# Patient Record
Sex: MALE | Race: BLACK OR AFRICAN AMERICAN | NOT HISPANIC OR LATINO | URBAN - METROPOLITAN AREA
[De-identification: names, ages, dates, MRNs, and addresses within clinical notes are randomized per-mention and may not be internally consistent; named-entity substitution may affect disease eponyms.]

---

## 2017-05-08 ENCOUNTER — EMERGENCY (EMERGENCY)
Facility: HOSPITAL | Age: 27
LOS: 1 days | Discharge: HOME | End: 2017-05-08
Attending: EMERGENCY MEDICINE | Admitting: EMERGENCY MEDICINE
Payer: COMMERCIAL

## 2017-05-08 VITALS
TEMPERATURE: 98 F | SYSTOLIC BLOOD PRESSURE: 127 MMHG | RESPIRATION RATE: 22 BRPM | WEIGHT: 203.27 LBS | OXYGEN SATURATION: 98 % | HEART RATE: 81 BPM | DIASTOLIC BLOOD PRESSURE: 78 MMHG

## 2017-05-08 VITALS
RESPIRATION RATE: 20 BRPM | SYSTOLIC BLOOD PRESSURE: 118 MMHG | OXYGEN SATURATION: 97 % | DIASTOLIC BLOOD PRESSURE: 71 MMHG | TEMPERATURE: 98 F | HEART RATE: 79 BPM

## 2017-05-08 DIAGNOSIS — J45.901 UNSPECIFIED ASTHMA WITH (ACUTE) EXACERBATION: ICD-10-CM

## 2017-05-08 DIAGNOSIS — Z79.899 OTHER LONG TERM (CURRENT) DRUG THERAPY: ICD-10-CM

## 2017-05-08 DIAGNOSIS — F17.290 NICOTINE DEPENDENCE, OTHER TOBACCO PRODUCT, UNCOMPLICATED: ICD-10-CM

## 2017-05-08 PROCEDURE — 99284 EMERGENCY DEPT VISIT MOD MDM: CPT | Mod: 25

## 2017-05-08 PROCEDURE — 99284 EMERGENCY DEPT VISIT MOD MDM: CPT

## 2017-05-08 PROCEDURE — 94640 AIRWAY INHALATION TREATMENT: CPT

## 2017-05-08 RX ORDER — IPRATROPIUM/ALBUTEROL SULFATE 18-103MCG
3 AEROSOL WITH ADAPTER (GRAM) INHALATION
Qty: 0 | Refills: 0 | Status: COMPLETED | OUTPATIENT
Start: 2017-05-08 | End: 2017-05-08

## 2017-05-08 RX ORDER — ALBUTEROL 90 UG/1
2 AEROSOL, METERED ORAL
Qty: 1 | Refills: 0
Start: 2017-05-08 | End: 2017-05-15

## 2017-05-08 RX ADMIN — Medication 3 MILLILITER(S): at 20:20

## 2017-05-08 RX ADMIN — Medication 3 MILLILITER(S): at 19:55

## 2017-05-08 RX ADMIN — Medication 60 MILLIGRAM(S): at 19:57

## 2017-05-08 RX ADMIN — Medication 3 MILLILITER(S): at 19:57

## 2017-05-08 NOTE — ED PROVIDER NOTE - OBJECTIVE STATEMENT
The pt is a 25 y/o M, who presents to ED c/o asthma exacerbation x few d. Hx of asthma - last attack 1 yr ago, never hosp, never intubated. Pt c/o wheezing and chest tightness, triggered by weather changes. Denies fevers, chills, cp, cough, n/v, leg pain or swelling

## 2017-05-08 NOTE — ED PROVIDER NOTE - MEDICAL DECISION MAKING DETAILS
asthma exacerbation w/o acute resp distress, no hypoxia or tachypnea, initial , ambulatory w/o sob, well appearing, improved w/nebs, will d/c w/short course of steroids, to f/u w/clinic

## 2017-05-08 NOTE — ED ADULT TRIAGE NOTE - CHIEF COMPLAINT QUOTE
c/o asthma exacerbation that started on Saturday accompanied with productive cough. Denies fever or chills.

## 2017-05-08 NOTE — ED PROVIDER NOTE - ATTENDING CONTRIBUTION TO CARE
pt seen and examined by me, agree with above. 25 yo male with hx of asthma here with asthma exacerbation,  usually well controlled uses rescue inhaler.  on exam mild exp wheeze throughout, no distress, heart normal.  feels better after neb treatments.  will dc on steroids and albuterol, fu pmd

## 2017-05-24 ENCOUNTER — EMERGENCY (EMERGENCY)
Facility: HOSPITAL | Age: 27
LOS: 1 days | Discharge: HOME | End: 2017-05-24
Attending: EMERGENCY MEDICINE | Admitting: EMERGENCY MEDICINE
Payer: COMMERCIAL

## 2017-05-24 VITALS
DIASTOLIC BLOOD PRESSURE: 66 MMHG | OXYGEN SATURATION: 100 % | HEART RATE: 68 BPM | RESPIRATION RATE: 18 BRPM | SYSTOLIC BLOOD PRESSURE: 118 MMHG

## 2017-05-24 VITALS
SYSTOLIC BLOOD PRESSURE: 125 MMHG | TEMPERATURE: 98 F | HEART RATE: 75 BPM | WEIGHT: 205.25 LBS | DIASTOLIC BLOOD PRESSURE: 66 MMHG | OXYGEN SATURATION: 95 % | RESPIRATION RATE: 17 BRPM

## 2017-05-24 DIAGNOSIS — J45.901 UNSPECIFIED ASTHMA WITH (ACUTE) EXACERBATION: ICD-10-CM

## 2017-05-24 DIAGNOSIS — Z79.899 OTHER LONG TERM (CURRENT) DRUG THERAPY: ICD-10-CM

## 2017-05-24 DIAGNOSIS — Z79.52 LONG TERM (CURRENT) USE OF SYSTEMIC STEROIDS: ICD-10-CM

## 2017-05-24 DIAGNOSIS — F17.200 NICOTINE DEPENDENCE, UNSPECIFIED, UNCOMPLICATED: ICD-10-CM

## 2017-05-24 PROCEDURE — 99284 EMERGENCY DEPT VISIT MOD MDM: CPT

## 2017-05-24 PROCEDURE — 94640 AIRWAY INHALATION TREATMENT: CPT

## 2017-05-24 PROCEDURE — 99284 EMERGENCY DEPT VISIT MOD MDM: CPT | Mod: 25

## 2017-05-24 RX ORDER — IPRATROPIUM/ALBUTEROL SULFATE 18-103MCG
3 AEROSOL WITH ADAPTER (GRAM) INHALATION
Qty: 0 | Refills: 0 | Status: COMPLETED | OUTPATIENT
Start: 2017-05-24 | End: 2017-05-24

## 2017-05-24 RX ADMIN — Medication 60 MILLIGRAM(S): at 15:05

## 2017-05-24 RX ADMIN — Medication 3 MILLILITER(S): at 15:24

## 2017-05-24 RX ADMIN — Medication 3 MILLILITER(S): at 16:00

## 2017-05-24 RX ADMIN — Medication 3 MILLILITER(S): at 15:05

## 2017-05-24 NOTE — ED PROVIDER NOTE - MEDICAL DECISION MAKING DETAILS
pt with asthma presents with exacerbation and wheezing - received nebs and steroids with good relief - will send home on prednisone

## 2017-05-24 NOTE — ED PROVIDER NOTE - OBJECTIVE STATEMENT
25 y/o m presents with wheezing and acute asthma exacerbation.  Denies fever, cough, chills, cough.  Has been using albuterol inhaler with some relief.

## 2017-05-24 NOTE — ED ADULT TRIAGE NOTE - ARRIVAL INFO ADDITIONAL COMMENTS
Pt presented to ED with cough. Wheezing noted bilaterally. Pt is able to speak coherently in full sentences. No intubation hx.

## 2017-06-01 ENCOUNTER — EMERGENCY (EMERGENCY)
Facility: HOSPITAL | Age: 27
LOS: 1 days | Discharge: HOME | End: 2017-06-01
Attending: EMERGENCY MEDICINE | Admitting: EMERGENCY MEDICINE
Payer: COMMERCIAL

## 2017-06-01 VITALS
HEART RATE: 73 BPM | OXYGEN SATURATION: 99 % | TEMPERATURE: 98 F | RESPIRATION RATE: 18 BRPM | SYSTOLIC BLOOD PRESSURE: 114 MMHG | DIASTOLIC BLOOD PRESSURE: 72 MMHG

## 2017-06-01 VITALS
HEART RATE: 103 BPM | WEIGHT: 208.34 LBS | SYSTOLIC BLOOD PRESSURE: 134 MMHG | RESPIRATION RATE: 18 BRPM | TEMPERATURE: 98 F | OXYGEN SATURATION: 95 % | DIASTOLIC BLOOD PRESSURE: 86 MMHG

## 2017-06-01 DIAGNOSIS — Z79.52 LONG TERM (CURRENT) USE OF SYSTEMIC STEROIDS: ICD-10-CM

## 2017-06-01 DIAGNOSIS — Z79.899 OTHER LONG TERM (CURRENT) DRUG THERAPY: ICD-10-CM

## 2017-06-01 DIAGNOSIS — J45.901 UNSPECIFIED ASTHMA WITH (ACUTE) EXACERBATION: ICD-10-CM

## 2017-06-01 LAB
ALBUMIN SERPL ELPH-MCNC: 4.9 G/DL — SIGNIFICANT CHANGE UP (ref 3.3–5)
ALP SERPL-CCNC: 65 U/L — SIGNIFICANT CHANGE UP (ref 40–120)
ALT FLD-CCNC: 48 U/L — HIGH (ref 10–45)
ANION GAP SERPL CALC-SCNC: 16 MMOL/L — SIGNIFICANT CHANGE UP (ref 5–17)
AST SERPL-CCNC: 22 U/L — SIGNIFICANT CHANGE UP (ref 10–40)
BASOPHILS NFR BLD AUTO: 0.3 % — SIGNIFICANT CHANGE UP (ref 0–2)
BILIRUB SERPL-MCNC: 0.4 MG/DL — SIGNIFICANT CHANGE UP (ref 0.2–1.2)
BUN SERPL-MCNC: 16 MG/DL — SIGNIFICANT CHANGE UP (ref 7–23)
CALCIUM SERPL-MCNC: 10.8 MG/DL — HIGH (ref 8.4–10.5)
CHLORIDE SERPL-SCNC: 100 MMOL/L — SIGNIFICANT CHANGE UP (ref 96–108)
CO2 SERPL-SCNC: 22 MMOL/L — SIGNIFICANT CHANGE UP (ref 22–31)
CREAT SERPL-MCNC: 0.9 MG/DL — SIGNIFICANT CHANGE UP (ref 0.5–1.3)
EOSINOPHIL NFR BLD AUTO: 0.3 % — SIGNIFICANT CHANGE UP (ref 0–6)
GLUCOSE SERPL-MCNC: 104 MG/DL — HIGH (ref 70–99)
HCT VFR BLD CALC: 44.1 % — SIGNIFICANT CHANGE UP (ref 39–50)
HGB BLD-MCNC: 15.5 G/DL — SIGNIFICANT CHANGE UP (ref 13–17)
LYMPHOCYTES # BLD AUTO: 10.2 % — LOW (ref 13–44)
MCHC RBC-ENTMCNC: 30.7 PG — SIGNIFICANT CHANGE UP (ref 27–34)
MCHC RBC-ENTMCNC: 35.1 G/DL — SIGNIFICANT CHANGE UP (ref 32–36)
MCV RBC AUTO: 87.3 FL — SIGNIFICANT CHANGE UP (ref 80–100)
MONOCYTES NFR BLD AUTO: 3.1 % — SIGNIFICANT CHANGE UP (ref 2–14)
NEUTROPHILS NFR BLD AUTO: 86.1 % — HIGH (ref 43–77)
PLATELET # BLD AUTO: 295 K/UL — SIGNIFICANT CHANGE UP (ref 150–400)
POTASSIUM SERPL-MCNC: 4.2 MMOL/L — SIGNIFICANT CHANGE UP (ref 3.5–5.3)
POTASSIUM SERPL-SCNC: 4.2 MMOL/L — SIGNIFICANT CHANGE UP (ref 3.5–5.3)
PROT SERPL-MCNC: 8.5 G/DL — HIGH (ref 6–8.3)
RBC # BLD: 5.05 M/UL — SIGNIFICANT CHANGE UP (ref 4.2–5.8)
RBC # FLD: 11 % — SIGNIFICANT CHANGE UP (ref 10.3–16.9)
SODIUM SERPL-SCNC: 138 MMOL/L — SIGNIFICANT CHANGE UP (ref 135–145)
WBC # BLD: 9.8 K/UL — SIGNIFICANT CHANGE UP (ref 3.8–10.5)
WBC # FLD AUTO: 9.8 K/UL — SIGNIFICANT CHANGE UP (ref 3.8–10.5)

## 2017-06-01 PROCEDURE — 99284 EMERGENCY DEPT VISIT MOD MDM: CPT

## 2017-06-01 PROCEDURE — 80053 COMPREHEN METABOLIC PANEL: CPT

## 2017-06-01 PROCEDURE — 99284 EMERGENCY DEPT VISIT MOD MDM: CPT | Mod: 25

## 2017-06-01 PROCEDURE — 96374 THER/PROPH/DIAG INJ IV PUSH: CPT

## 2017-06-01 PROCEDURE — 71020: CPT | Mod: 26

## 2017-06-01 PROCEDURE — 85025 COMPLETE CBC W/AUTO DIFF WBC: CPT

## 2017-06-01 PROCEDURE — 36415 COLL VENOUS BLD VENIPUNCTURE: CPT

## 2017-06-01 PROCEDURE — 94640 AIRWAY INHALATION TREATMENT: CPT

## 2017-06-01 PROCEDURE — 71046 X-RAY EXAM CHEST 2 VIEWS: CPT

## 2017-06-01 PROCEDURE — 96375 TX/PRO/DX INJ NEW DRUG ADDON: CPT

## 2017-06-01 RX ORDER — MAGNESIUM SULFATE 500 MG/ML
2 VIAL (ML) INJECTION ONCE
Qty: 0 | Refills: 0 | Status: COMPLETED | OUTPATIENT
Start: 2017-06-01 | End: 2017-06-01

## 2017-06-01 RX ORDER — ALBUTEROL 90 UG/1
2 AEROSOL, METERED ORAL
Qty: 1 | Refills: 0
Start: 2017-06-01 | End: 2017-06-08

## 2017-06-01 RX ORDER — SODIUM CHLORIDE 9 MG/ML
1000 INJECTION INTRAMUSCULAR; INTRAVENOUS; SUBCUTANEOUS ONCE
Qty: 0 | Refills: 0 | Status: COMPLETED | OUTPATIENT
Start: 2017-06-01 | End: 2017-06-01

## 2017-06-01 RX ORDER — IPRATROPIUM/ALBUTEROL SULFATE 18-103MCG
3 AEROSOL WITH ADAPTER (GRAM) INHALATION ONCE
Qty: 0 | Refills: 0 | Status: COMPLETED | OUTPATIENT
Start: 2017-06-01 | End: 2017-06-01

## 2017-06-01 RX ADMIN — Medication 125 MILLIGRAM(S): at 17:53

## 2017-06-01 RX ADMIN — Medication 3 MILLILITER(S): at 14:25

## 2017-06-01 RX ADMIN — Medication 3 MILLILITER(S): at 17:58

## 2017-06-01 RX ADMIN — SODIUM CHLORIDE 1000 MILLILITER(S): 9 INJECTION INTRAMUSCULAR; INTRAVENOUS; SUBCUTANEOUS at 17:55

## 2017-06-01 RX ADMIN — Medication 60 MILLIGRAM(S): at 14:32

## 2017-06-01 RX ADMIN — Medication 50 GRAM(S): at 17:55

## 2017-06-01 RX ADMIN — Medication 3 MILLILITER(S): at 14:32

## 2017-06-01 NOTE — ED PROVIDER NOTE - PROGRESS NOTE DETAILS
clinically improved peak flow= clinically improved peak flow= 275- 300 -- his normal is 300 will dc home return precautions d/w pt including  fevers chills  sob increased cough

## 2017-06-01 NOTE — ED PROVIDER NOTE - MEDICAL DECISION MAKING DETAILS
recurrent asthma exaccerbation- neg CXR - likely viral uri - peak flow improved   no further wheezing---no sputum production or fevers or chills well appearing ? seasonal changes

## 2017-06-01 NOTE — ED PROVIDER NOTE - OBJECTIVE STATEMENT
25 yo male with hx of asthma last exacerbation 3 weeks ago  now with recurrent wheezing and dry cough x 1 day  no CP or sob  no N/V   no fevers or chills was complaint with 4 days of prrednisone 3 weeks ago - appetite ok 25 yo male with hx of asthma last exacerbation 3 weeks ago  now with recurrent wheezing and dry cough x 1 day  no CP or sob  no N/V   no fevers or chills was complaint with 4 days of prednisone 3 weeks ago - appetite ok

## 2018-05-18 ENCOUNTER — EMERGENCY (EMERGENCY)
Facility: HOSPITAL | Age: 28
LOS: 1 days | Discharge: HOME | End: 2018-05-18
Attending: EMERGENCY MEDICINE | Admitting: EMERGENCY MEDICINE
Payer: COMMERCIAL

## 2018-05-18 VITALS
TEMPERATURE: 98 F | SYSTOLIC BLOOD PRESSURE: 130 MMHG | DIASTOLIC BLOOD PRESSURE: 76 MMHG | RESPIRATION RATE: 20 BRPM | HEART RATE: 101 BPM | OXYGEN SATURATION: 94 %

## 2018-05-18 VITALS
OXYGEN SATURATION: 96 % | SYSTOLIC BLOOD PRESSURE: 125 MMHG | RESPIRATION RATE: 20 BRPM | DIASTOLIC BLOOD PRESSURE: 77 MMHG | HEART RATE: 89 BPM | TEMPERATURE: 98 F

## 2018-05-18 DIAGNOSIS — J45.21 MILD INTERMITTENT ASTHMA WITH (ACUTE) EXACERBATION: ICD-10-CM

## 2018-05-18 DIAGNOSIS — Z79.899 OTHER LONG TERM (CURRENT) DRUG THERAPY: ICD-10-CM

## 2018-05-18 DIAGNOSIS — Z87.891 PERSONAL HISTORY OF NICOTINE DEPENDENCE: ICD-10-CM

## 2018-05-18 PROCEDURE — 99284 EMERGENCY DEPT VISIT MOD MDM: CPT

## 2018-05-18 PROCEDURE — 99284 EMERGENCY DEPT VISIT MOD MDM: CPT | Mod: 25

## 2018-05-18 PROCEDURE — 94640 AIRWAY INHALATION TREATMENT: CPT

## 2018-05-18 RX ORDER — IPRATROPIUM/ALBUTEROL SULFATE 18-103MCG
3 AEROSOL WITH ADAPTER (GRAM) INHALATION ONCE
Qty: 0 | Refills: 0 | Status: COMPLETED | OUTPATIENT
Start: 2018-05-18 | End: 2018-05-18

## 2018-05-18 RX ORDER — ALBUTEROL 90 UG/1
2 AEROSOL, METERED ORAL
Qty: 1 | Refills: 0
Start: 2018-05-18 | End: 2018-06-16

## 2018-05-18 RX ADMIN — Medication 3 MILLILITER(S): at 18:40

## 2018-05-18 RX ADMIN — Medication 40 MILLIGRAM(S): at 18:54

## 2018-05-18 RX ADMIN — Medication 3 MILLILITER(S): at 18:54

## 2018-05-18 NOTE — ED PROVIDER NOTE - ATTENDING CONTRIBUTION TO CARE
Pt w hx of asthma w wheezing, no acute sob, tachypnea, received steroids, neb treatments with improvement, ambulating, sats above 95, no s/s of PE, pna, will dc with continuation of q4 nebs, steroids, return for worsening sx.

## 2018-05-18 NOTE — ED PROVIDER NOTE - PHYSICAL EXAMINATION
CONSTITUTIONAL: WD,WN. NAD.    SKIN: Normal color and turgor. No rash.    HEAD: NC/AT.  EYES: Conjunctiva clear. EOMI. PERRL.    ENT: Airway patent, OP without erythema, tonsillar swelling or exudate; uvula midline without swelling. Nasal mucosa clear, no rhinorrhea.   RESPIRATORY:  .  Coughing frequently.  Breathing non-labored. No retractions or accessory muscle use.  Lungs mild wheeze bilat.    CARDIOVASCULAR:  RRR, S1S2. No M/R/G.      GI:  Abdomen soft, nontender.    MSK: Neck supple with painless ROM.  No extremity edema or tenderness.  No joint swelling or ROM limitation.  NEURO: Alert and oriented; CN II-XII grossly intact. Speech clear. 5/5 strength in all extremities.  Normal balance and gait.

## 2018-05-18 NOTE — ED PROVIDER NOTE - MEDICAL DECISION MAKING DETAILS
Asthma exacerbation improved markedly with 2 nebs, PO steroid started.  PERC neg, do not suspect PE.  No sputum/fever to suggest pneumonia.  Will continue prednisone for 3 more days.

## 2018-05-18 NOTE — ED PROVIDER NOTE - OBJECTIVE STATEMENT
pt with Hx mild intermittent asthma c/o cough and chest tightness c/w his usual asthma.  began about a week ago, seen at [facility] ED 4 days ago tx with 2 nebs and a single dose steroids.  symptoms waxing/waning, has used apx 50 doses of albuterol in past 2 days.  trigger is environmental/seasonal.  no chest pain, fever, sputum/hemoptysis.  no hx of DVT/PE.  no recent immobilization, leg swelling.  no hx of heart disease, HTN, HLD.  never intubated or hospitalized.  PMHx asthma  PSHx none  Rx albuterol PRN  NKA  Soc: quit smoking 2 weeks ago; no cocaine/other drugs  Fam: no hx of early heart disease/sudden death

## 2018-05-18 NOTE — ED PROVIDER NOTE - NS ED ROS FT
CONSTITUTIONAL: No fever, chills, or weakness  NEURO: No headache, no dizziness, no syncope; No weakness/tingling/numbness  EYES: itchy watery eys before asthma attack  ENT: rhinorrhea and nasal congestion prior to asthma attack  PULM: HPI  CV: No chest pain or palpitations  GI: No abdominal pain, vomiting, or diarrhea  : No dysuria, hematuria, frequency  MSK: No neck pain or back pain, no joint pain  SKIN: no rash

## 2019-04-15 ENCOUNTER — EMERGENCY (EMERGENCY)
Facility: HOSPITAL | Age: 29
LOS: 1 days | Discharge: HOME | End: 2019-04-15
Admitting: EMERGENCY MEDICINE
Payer: COMMERCIAL

## 2019-04-15 VITALS
HEART RATE: 75 BPM | SYSTOLIC BLOOD PRESSURE: 128 MMHG | WEIGHT: 205.25 LBS | TEMPERATURE: 98 F | RESPIRATION RATE: 18 BRPM | OXYGEN SATURATION: 99 % | DIASTOLIC BLOOD PRESSURE: 77 MMHG | HEIGHT: 71 IN

## 2019-04-15 PROCEDURE — 73030 X-RAY EXAM OF SHOULDER: CPT | Mod: 26,RT

## 2019-04-15 PROCEDURE — 70486 CT MAXILLOFACIAL W/O DYE: CPT | Mod: 26

## 2019-04-15 PROCEDURE — 73030 X-RAY EXAM OF SHOULDER: CPT

## 2019-04-15 PROCEDURE — 90715 TDAP VACCINE 7 YRS/> IM: CPT

## 2019-04-15 PROCEDURE — 73562 X-RAY EXAM OF KNEE 3: CPT | Mod: 26,RT

## 2019-04-15 PROCEDURE — 70450 CT HEAD/BRAIN W/O DYE: CPT

## 2019-04-15 PROCEDURE — 99284 EMERGENCY DEPT VISIT MOD MDM: CPT | Mod: 25

## 2019-04-15 PROCEDURE — 99284 EMERGENCY DEPT VISIT MOD MDM: CPT

## 2019-04-15 PROCEDURE — 90471 IMMUNIZATION ADMIN: CPT

## 2019-04-15 PROCEDURE — 70486 CT MAXILLOFACIAL W/O DYE: CPT

## 2019-04-15 PROCEDURE — 70450 CT HEAD/BRAIN W/O DYE: CPT | Mod: 26

## 2019-04-15 PROCEDURE — 73562 X-RAY EXAM OF KNEE 3: CPT

## 2019-04-15 RX ORDER — TETANUS TOXOID, REDUCED DIPHTHERIA TOXOID AND ACELLULAR PERTUSSIS VACCINE, ADSORBED 5; 2.5; 8; 8; 2.5 [IU]/.5ML; [IU]/.5ML; UG/.5ML; UG/.5ML; UG/.5ML
0.5 SUSPENSION INTRAMUSCULAR ONCE
Qty: 0 | Refills: 0 | Status: COMPLETED | OUTPATIENT
Start: 2019-04-15 | End: 2019-04-15

## 2019-04-15 RX ADMIN — TETANUS TOXOID, REDUCED DIPHTHERIA TOXOID AND ACELLULAR PERTUSSIS VACCINE, ADSORBED 0.5 MILLILITER(S): 5; 2.5; 8; 8; 2.5 SUSPENSION INTRAMUSCULAR at 19:19
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
## 2019-04-15 NOTE — ED ADULT NURSE NOTE - NSIMPLEMENTINTERV_GEN_ALL_ED
Implemented All Fall Risk Interventions:  [place] to call system. Call bell, personal items and telephone within reach. Instruct patient to call for assistance. Room bathroom lighting operational. Non-slip footwear when patient is off stretcher. Physically safe environment: no spills, clutter or unnecessary equipment. Stretcher in lowest position, wheels locked, appropriate side rails in place. Provide visual cue, wrist band, yellow gown, etc. Monitor gait and stability. Monitor for mental status changes and reorient to person, place, and time. Review medications for side effects contributing to fall risk. Reinforce activity limits and safety measures with patient and family.

## 2019-04-15 NOTE — ED PROVIDER NOTE - CLINICAL SUMMARY MEDICAL DECISION MAKING FREE TEXT BOX
29 y/o m s/p fall down steps 2 days ago with right facial injuries, right shoulder and knee pain; no LOC or neuro deficits, tetanus updated, CT head and maxillofacial negative, xrays unremarkable.  Will d/c, continue wound care, facial lac will heal by 2/2 intention.

## 2019-04-15 NOTE — ED PROVIDER NOTE - NSFOLLOWUPINSTRUCTIONS_ED_ALL_ED_FT
Laceration    AMBULATORY CARE:    A laceration is an injury to the skin and the soft tissue underneath it. Lacerations happen when you are cut or hit by something. They can happen anywhere on the body.    Common symptoms include the following:     Injury or wound to skin and tissue of any shape size that looks like a cut, tear, or gash      Edges of the wound may be close together or wide apart      Pain, bleeding, bruising, or swelling      Numbness around the wound      Decreased movement in an area below the wound    Seek care immediately if:     You have heavy bleeding or bleeding that does not stop after 10 minutes of holding firm, direct pressure over the wound.       Your wound opens up.     Contact your healthcare provider if:     You have a fever or chills.       Your laceration is red, warm, or swollen.      You have red streaks on your skin coming from your wound.      You have white or yellow drainage from the wound that smells bad.      You have pain that gets worse, even after treatment.       You have questions or concerns about your condition or care.     Treatment for a laceration includes care to stop any bleeding. Your healthcare provider will stop the bleeding by applying pressure to the wound. He may need to check your wound for foreign objects and clean it to decrease the chance of infection. Your laceration may be closed with stitches, staples, tissue glue, or medical strips. Ask your healthcare provider if you need a tetanus shot.     Medicines:     Prescription pain medicine may be given. Ask how to take this medicine safely.       Antibiotics help treat or prevent a bacterial infection.       Take your medicine as directed. Contact your healthcare provider if you think your medicine is not helping or if you have side effects. Tell him or her if you are allergic to any medicine. Keep a list of the medicines, vitamins, and herbs you take. Include the amounts, and when and why you take them. Bring the list or the pill bottles to follow-up visits. Carry your medicine list with you in case of an emergency.    Care for your wound as directed:     Do not get your wound wet until your healthcare provider says it is okay. Do not soak your wound in water. Do not go swimming until your healthcare provider says it is okay. Carefully wash the wound with soap and water. Gently pat the area dry or allow it to air dry.       Change your bandages when they get wet, dirty, or after washing. Apply new, clean bandages as directed. Do not apply elastic bandages or tape too tight. Do not put powders or lotions over your incision.       Apply antibiotic ointment as directed. Your healthcare provider may give you antibiotic ointment to put over your wound if you have stitches. If you have strips of tape over your incision, let them dry up and fall off on their own. If they do not fall off within 14 days, gently remove them. If you have glue over your wound, do not remove or pick at it. If your glue comes off, do not replace it with glue that you have at home.       Check your wound every day for signs of infection such as swelling, redness, or pus.     Self-care:     Apply ice on your wound for 15 to 20 minutes every hour or as directed. Use an ice pack, or put crushed ice in a plastic bag. Cover it with a towel. Ice helps prevent tissue damage and decreases swelling and pain.      Use a splint as directed. A splint will decrease movement and stress on your wound. It may help it heal faster. A splint may be used for lacerations over joints or areas of your body that bend. Ask your healthcare provider how to apply and remove a splint.       Decrease scarring of your wound by applying ointments as directed. Do not apply ointments until your healthcare provider says it is okay. You may need to wait until your wound is healed. Ask which ointment to buy and how often to use it. After your wound is healed, use sunscreen over the area when you are out in the sun. You should do this for at least 6 months to 1 year after your injury.     Follow up with your healthcare provider as directed: Write down your questions so you remember to ask them during your visits.

## 2019-04-15 NOTE — ED PROVIDER NOTE - ENMT, MLM
2 cm laceration above right eyebrow, +abrasion and tenderness to right cheek below right eye, dentition intact, no mandibular tenderness

## 2019-04-15 NOTE — ED PROVIDER NOTE - MUSCULOSKELETAL, MLM
no cervical spine tenderness, +anterior tenderness to right shoulder with no deformity.  +mild anterior right knee tenderness with no swelling or deformity, +FROM knee

## 2019-04-15 NOTE — ED ADULT NURSE NOTE - OBJECTIVE STATEMENT
s/p trip and fall yesterday.  +laceration 1.5cm to right eyebrow, denies LOC, bleeding controlled.  Last Tetanus unknown.
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
## 2019-04-19 DIAGNOSIS — M25.511 PAIN IN RIGHT SHOULDER: ICD-10-CM

## 2019-04-19 DIAGNOSIS — Z23 ENCOUNTER FOR IMMUNIZATION: ICD-10-CM
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
## 2019-04-19 DIAGNOSIS — S01.111A LACERATION WITHOUT FOREIGN BODY OF RIGHT EYELID AND PERIOCULAR AREA, INITIAL ENCOUNTER: ICD-10-CM

## 2019-04-19 DIAGNOSIS — S09.90XA UNSPECIFIED INJURY OF HEAD, INITIAL ENCOUNTER: ICD-10-CM

## 2019-04-19 DIAGNOSIS — Z79.52 LONG TERM (CURRENT) USE OF SYSTEMIC STEROIDS: ICD-10-CM
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
## 2019-04-19 DIAGNOSIS — Z79.899 OTHER LONG TERM (CURRENT) DRUG THERAPY: ICD-10-CM

## 2019-04-19 DIAGNOSIS — S00.81XA ABRASION OF OTHER PART OF HEAD, INITIAL ENCOUNTER: ICD-10-CM

## 2019-04-19 DIAGNOSIS — M25.561 PAIN IN RIGHT KNEE: ICD-10-CM

## 2020-04-30 NOTE — ED ADULT TRIAGE NOTE - AS TEMP SITE
Health Maintenance Due   Topic Date Due   • Pneumococcal Vaccine 0-64 (2 of 3 - PCV13) 12/26/2019       Patient is due for topics as listed above but is not proceeding with Immunization(s) Pneumococcal at this time.          oral

## 2020-05-24 ENCOUNTER — EMERGENCY (EMERGENCY)
Facility: HOSPITAL | Age: 30
LOS: 1 days | Discharge: HOME | End: 2020-05-24
Attending: EMERGENCY MEDICINE | Admitting: EMERGENCY MEDICINE
Payer: COMMERCIAL

## 2020-05-24 VITALS
RESPIRATION RATE: 20 BRPM | TEMPERATURE: 98 F | HEART RATE: 98 BPM | OXYGEN SATURATION: 96 % | DIASTOLIC BLOOD PRESSURE: 71 MMHG | SYSTOLIC BLOOD PRESSURE: 164 MMHG

## 2020-05-24 PROCEDURE — 99285 EMERGENCY DEPT VISIT HI MDM: CPT

## 2020-05-24 PROCEDURE — 94640 AIRWAY INHALATION TREATMENT: CPT

## 2020-05-24 PROCEDURE — 99284 EMERGENCY DEPT VISIT MOD MDM: CPT | Mod: 25

## 2020-05-24 RX ORDER — ALBUTEROL 90 UG/1
2 AEROSOL, METERED ORAL
Qty: 1 | Refills: 0
Start: 2020-05-24 | End: 2020-06-22

## 2020-05-24 RX ORDER — IPRATROPIUM/ALBUTEROL SULFATE 18-103MCG
3 AEROSOL WITH ADAPTER (GRAM) INHALATION ONCE
Refills: 0 | Status: COMPLETED | OUTPATIENT
Start: 2020-05-24 | End: 2020-05-24

## 2020-05-24 RX ADMIN — Medication 60 MILLIGRAM(S): at 01:20

## 2020-05-24 RX ADMIN — Medication 3 MILLILITER(S): at 01:20

## 2020-05-24 RX ADMIN — Medication 3 MILLILITER(S): at 02:03

## 2020-05-24 NOTE — ED ADULT NURSE NOTE - NSIMPLEMENTINTERV_GEN_ALL_ED
Implemented All Universal Safety Interventions:  [place] to call system. Call bell, personal items and telephone within reach. Instruct patient to call for assistance. Room bathroom lighting operational. Non-slip footwear when patient is off stretcher. Physically safe environment: no spills, clutter or unnecessary equipment. Stretcher in lowest position, wheels locked, appropriate side rails in place.

## 2020-05-24 NOTE — ED ADULT NURSE NOTE - OBJECTIVE STATEMENT
pt states that he has been having increasing trouble with his asthma after he stopped smoking 2 months ago.  states that he has ran out of his inhaler and has now has just an abluteral inhaler and states that it isn't working as well as the other was.

## 2020-05-24 NOTE — ED PROVIDER NOTE - OBJECTIVE STATEMENT
30 y/o m hx asthma, never intubated, presents c/o intermittent chest tightness/wheezing over the past 2 months which worsened today.  Pt has been using albuterol inhaler which he ran out of, doesn't have a nebulizer.  Pt denies fever, chills, cough, CP, all other ROS negative.

## 2020-05-24 NOTE — ED PROVIDER NOTE - PATIENT PORTAL LINK FT
You can access the FollowMyHealth Patient Portal offered by [facility] by registering at the following website: [email or web address]. By joining [facility]’s FollowMyHealth portal, you will also be able to view your health information using other applications (apps) compatible with our system.

## 2020-05-24 NOTE — ED PROVIDER NOTE - CLINICAL SUMMARY MEDICAL DECISION MAKING FREE TEXT BOX
28 y/o m hx asthma, never intubated, presents c/o intermittent chest tightness/wheezing over the past 2 months, worsening today; pt afebrile, not hypoxic or tachypneic, had wheezing initially on exam.  Wheezing resolved after 2 nebs and prednisone.  Will d/c with rx for inhaler and prednisone, f/u pmd, return to ED if sx worsen.

## 2020-05-28 DIAGNOSIS — R07.89 OTHER CHEST PAIN: ICD-10-CM

## 2020-05-28 DIAGNOSIS — Z79.52 LONG TERM (CURRENT) USE OF SYSTEMIC STEROIDS: ICD-10-CM

## 2020-05-28 DIAGNOSIS — J45.901 UNSPECIFIED ASTHMA WITH (ACUTE) EXACERBATION: ICD-10-CM

## 2020-05-28 DIAGNOSIS — Z79.899 OTHER LONG TERM (CURRENT) DRUG THERAPY: ICD-10-CM

## 2020-06-04 ENCOUNTER — INPATIENT (INPATIENT)
Facility: HOSPITAL | Age: 30
LOS: 0 days | Discharge: HOME | DRG: 203 | End: 2020-06-05
Attending: INTERNAL MEDICINE | Admitting: INTERNAL MEDICINE
Payer: COMMERCIAL

## 2020-06-04 ENCOUNTER — TRANSCRIPTION ENCOUNTER (OUTPATIENT)
Age: 30
End: 2020-06-04

## 2020-06-04 VITALS
RESPIRATION RATE: 23 BRPM | HEART RATE: 101 BPM | SYSTOLIC BLOOD PRESSURE: 133 MMHG | TEMPERATURE: 98 F | HEIGHT: 68 IN | DIASTOLIC BLOOD PRESSURE: 78 MMHG | WEIGHT: 175.05 LBS

## 2020-06-04 DIAGNOSIS — R63.8 OTHER SYMPTOMS AND SIGNS CONCERNING FOOD AND FLUID INTAKE: ICD-10-CM

## 2020-06-04 DIAGNOSIS — J45.21 MILD INTERMITTENT ASTHMA WITH (ACUTE) EXACERBATION: ICD-10-CM

## 2020-06-04 LAB
ALBUMIN SERPL ELPH-MCNC: 4.6 G/DL — SIGNIFICANT CHANGE UP (ref 3.3–5)
ALP SERPL-CCNC: 69 U/L — SIGNIFICANT CHANGE UP (ref 40–120)
ALT FLD-CCNC: 30 U/L — SIGNIFICANT CHANGE UP (ref 10–45)
ANION GAP SERPL CALC-SCNC: 16 MMOL/L — SIGNIFICANT CHANGE UP (ref 5–17)
AST SERPL-CCNC: 26 U/L — SIGNIFICANT CHANGE UP (ref 10–40)
BASOPHILS # BLD AUTO: 0.04 K/UL — SIGNIFICANT CHANGE UP (ref 0–0.2)
BASOPHILS NFR BLD AUTO: 0.4 % — SIGNIFICANT CHANGE UP (ref 0–2)
BILIRUB SERPL-MCNC: 0.2 MG/DL — SIGNIFICANT CHANGE UP (ref 0.2–1.2)
BUN SERPL-MCNC: 14 MG/DL — SIGNIFICANT CHANGE UP (ref 7–23)
CALCIUM SERPL-MCNC: 9.9 MG/DL — SIGNIFICANT CHANGE UP (ref 8.4–10.5)
CHLORIDE SERPL-SCNC: 100 MMOL/L — SIGNIFICANT CHANGE UP (ref 96–108)
CO2 SERPL-SCNC: 24 MMOL/L — SIGNIFICANT CHANGE UP (ref 22–31)
CREAT SERPL-MCNC: 1.26 MG/DL — SIGNIFICANT CHANGE UP (ref 0.5–1.3)
EOSINOPHIL # BLD AUTO: 0.25 K/UL — SIGNIFICANT CHANGE UP (ref 0–0.5)
EOSINOPHIL NFR BLD AUTO: 2.3 % — SIGNIFICANT CHANGE UP (ref 0–6)
GLUCOSE SERPL-MCNC: 92 MG/DL — SIGNIFICANT CHANGE UP (ref 70–99)
HCT VFR BLD CALC: 45.6 % — SIGNIFICANT CHANGE UP (ref 39–50)
HGB BLD-MCNC: 15.1 G/DL — SIGNIFICANT CHANGE UP (ref 13–17)
IMM GRANULOCYTES NFR BLD AUTO: 1.4 % — SIGNIFICANT CHANGE UP (ref 0–1.5)
LYMPHOCYTES # BLD AUTO: 2.93 K/UL — SIGNIFICANT CHANGE UP (ref 1–3.3)
LYMPHOCYTES # BLD AUTO: 26.9 % — SIGNIFICANT CHANGE UP (ref 13–44)
MCHC RBC-ENTMCNC: 30.1 PG — SIGNIFICANT CHANGE UP (ref 27–34)
MCHC RBC-ENTMCNC: 33.1 GM/DL — SIGNIFICANT CHANGE UP (ref 32–36)
MCV RBC AUTO: 90.8 FL — SIGNIFICANT CHANGE UP (ref 80–100)
MONOCYTES # BLD AUTO: 0.93 K/UL — HIGH (ref 0–0.9)
MONOCYTES NFR BLD AUTO: 8.5 % — SIGNIFICANT CHANGE UP (ref 2–14)
NEUTROPHILS # BLD AUTO: 6.6 K/UL — SIGNIFICANT CHANGE UP (ref 1.8–7.4)
NEUTROPHILS NFR BLD AUTO: 60.5 % — SIGNIFICANT CHANGE UP (ref 43–77)
NRBC # BLD: 0 /100 WBCS — SIGNIFICANT CHANGE UP (ref 0–0)
NT-PROBNP SERPL-SCNC: <5 PG/ML — SIGNIFICANT CHANGE UP (ref 0–300)
PLATELET # BLD AUTO: 282 K/UL — SIGNIFICANT CHANGE UP (ref 150–400)
POTASSIUM SERPL-MCNC: 3.8 MMOL/L — SIGNIFICANT CHANGE UP (ref 3.5–5.3)
POTASSIUM SERPL-SCNC: 3.8 MMOL/L — SIGNIFICANT CHANGE UP (ref 3.5–5.3)
PROT SERPL-MCNC: 8.3 G/DL — SIGNIFICANT CHANGE UP (ref 6–8.3)
RBC # BLD: 5.02 M/UL — SIGNIFICANT CHANGE UP (ref 4.2–5.8)
RBC # FLD: 11.3 % — SIGNIFICANT CHANGE UP (ref 10.3–14.5)
SARS-COV-2 RNA SPEC QL NAA+PROBE: SIGNIFICANT CHANGE UP
SODIUM SERPL-SCNC: 140 MMOL/L — SIGNIFICANT CHANGE UP (ref 135–145)
TROPONIN T SERPL-MCNC: <0.01 NG/ML — SIGNIFICANT CHANGE UP (ref 0–0.01)
WBC # BLD: 10.9 K/UL — HIGH (ref 3.8–10.5)
WBC # FLD AUTO: 10.9 K/UL — HIGH (ref 3.8–10.5)

## 2020-06-04 PROCEDURE — 99291 CRITICAL CARE FIRST HOUR: CPT | Mod: CR

## 2020-06-04 PROCEDURE — 99223 1ST HOSP IP/OBS HIGH 75: CPT | Mod: GC

## 2020-06-04 PROCEDURE — 71045 X-RAY EXAM CHEST 1 VIEW: CPT | Mod: 26

## 2020-06-04 PROCEDURE — 93010 ELECTROCARDIOGRAM REPORT: CPT

## 2020-06-04 RX ORDER — IPRATROPIUM/ALBUTEROL SULFATE 18-103MCG
3 AEROSOL WITH ADAPTER (GRAM) INHALATION EVERY 4 HOURS
Refills: 0 | Status: DISCONTINUED | OUTPATIENT
Start: 2020-06-04 | End: 2020-06-05

## 2020-06-04 RX ORDER — ALBUTEROL 90 UG/1
2.5 AEROSOL, METERED ORAL
Refills: 0 | Status: COMPLETED | OUTPATIENT
Start: 2020-06-04 | End: 2020-06-04

## 2020-06-04 RX ORDER — MAGNESIUM SULFATE 500 MG/ML
2 VIAL (ML) INJECTION ONCE
Refills: 0 | Status: COMPLETED | OUTPATIENT
Start: 2020-06-04 | End: 2020-06-04

## 2020-06-04 RX ORDER — ALBUTEROL 90 UG/1
2 AEROSOL, METERED ORAL
Qty: 1 | Refills: 0
Start: 2020-06-04 | End: 2020-07-03

## 2020-06-04 RX ORDER — BUDESONIDE, MICRONIZED 100 %
0.25 POWDER (GRAM) MISCELLANEOUS
Refills: 0 | Status: DISCONTINUED | OUTPATIENT
Start: 2020-06-04 | End: 2020-06-05

## 2020-06-04 RX ORDER — PANTOPRAZOLE SODIUM 20 MG/1
40 TABLET, DELAYED RELEASE ORAL
Refills: 0 | Status: DISCONTINUED | OUTPATIENT
Start: 2020-06-04 | End: 2020-06-05

## 2020-06-04 RX ORDER — IPRATROPIUM/ALBUTEROL SULFATE 18-103MCG
3 AEROSOL WITH ADAPTER (GRAM) INHALATION
Refills: 0 | Status: COMPLETED | OUTPATIENT
Start: 2020-06-04 | End: 2020-06-04

## 2020-06-04 RX ORDER — ACETAMINOPHEN 500 MG
650 TABLET ORAL EVERY 6 HOURS
Refills: 0 | Status: DISCONTINUED | OUTPATIENT
Start: 2020-06-04 | End: 2020-06-05

## 2020-06-04 RX ORDER — SODIUM CHLORIDE 9 MG/ML
1000 INJECTION INTRAMUSCULAR; INTRAVENOUS; SUBCUTANEOUS ONCE
Refills: 0 | Status: COMPLETED | OUTPATIENT
Start: 2020-06-04 | End: 2020-06-04

## 2020-06-04 RX ORDER — IPRATROPIUM/ALBUTEROL SULFATE 18-103MCG
3 AEROSOL WITH ADAPTER (GRAM) INHALATION ONCE
Refills: 0 | Status: COMPLETED | OUTPATIENT
Start: 2020-06-04 | End: 2020-06-04

## 2020-06-04 RX ADMIN — Medication 50 GRAM(S): at 02:47

## 2020-06-04 RX ADMIN — Medication 3 MILLILITER(S): at 02:26

## 2020-06-04 RX ADMIN — ALBUTEROL 2.5 MILLIGRAM(S): 90 AEROSOL, METERED ORAL at 04:00

## 2020-06-04 RX ADMIN — Medication 650 MILLIGRAM(S): at 10:49

## 2020-06-04 RX ADMIN — Medication 0.25 MILLIGRAM(S): at 17:27

## 2020-06-04 RX ADMIN — Medication 2 GRAM(S): at 03:40

## 2020-06-04 RX ADMIN — SODIUM CHLORIDE 1000 MILLILITER(S): 9 INJECTION INTRAMUSCULAR; INTRAVENOUS; SUBCUTANEOUS at 03:40

## 2020-06-04 RX ADMIN — PANTOPRAZOLE SODIUM 40 MILLIGRAM(S): 20 TABLET, DELAYED RELEASE ORAL at 05:56

## 2020-06-04 RX ADMIN — Medication 3 MILLILITER(S): at 05:56

## 2020-06-04 RX ADMIN — Medication 3 MILLILITER(S): at 17:27

## 2020-06-04 RX ADMIN — Medication 3 MILLILITER(S): at 21:14

## 2020-06-04 RX ADMIN — Medication 3 MILLILITER(S): at 01:33

## 2020-06-04 RX ADMIN — ALBUTEROL 2.5 MILLIGRAM(S): 90 AEROSOL, METERED ORAL at 03:25

## 2020-06-04 RX ADMIN — Medication 3 MILLILITER(S): at 15:06

## 2020-06-04 RX ADMIN — Medication 40 MILLIGRAM(S): at 04:45

## 2020-06-04 RX ADMIN — Medication 3 MILLILITER(S): at 10:34

## 2020-06-04 RX ADMIN — Medication 3 MILLILITER(S): at 01:54

## 2020-06-04 RX ADMIN — Medication 40 MILLIGRAM(S): at 01:31

## 2020-06-04 RX ADMIN — SODIUM CHLORIDE 1000 MILLILITER(S): 9 INJECTION INTRAMUSCULAR; INTRAVENOUS; SUBCUTANEOUS at 02:48

## 2020-06-04 RX ADMIN — Medication 3 MILLILITER(S): at 08:30

## 2020-06-04 NOTE — DISCHARGE NOTE PROVIDER - CARE PROVIDER_API CALL
Stephanie Bernal  CRITICAL CARE MEDICINE  [address] [address]  Phone: [phone]  Fax: [phone]  Follow Up Time:     Han Mcbride  CRITICAL CARE MEDICINE  [address] [place] [address]  Phone: [phone]  Fax: [phone]  Follow Up Time:

## 2020-06-04 NOTE — H&P ADULT - PROBLEM SELECTOR PLAN 1
History of asthma exacerbations which have not been History of asthma exacerbations typically in springtime. Only w/ albuterol rescue inhaler, dose not take maintenance medication. Has never been hospitalized. Never intubated. , patient reports typically 300s (ideal 550 for this patient). Noted to be using accessory muscles on exam though able to speak in full sentences. Received 3x duonebs and 40mg PO prednisone. Gave additional 40mg IV solumedrol  - C/w prednisone 40mg PO qd  - C/w duonebs q4  - Will likely need to be started on low dose ICS prior to DC  - Patient also reports gerd-like symptoms. Will start on protonix 40 PO qd to prevent further asthma exacerbations in the setting of untreated GERD

## 2020-06-04 NOTE — DISCHARGE NOTE PROVIDER - HOSPITAL COURSE
#asthma exacerbation: This is a pt with a history of asthma exacerbations typically in springtime, usu 3-4 yearly. Only w/ albuterol rescue inhaler, dose not take maintenance medication. Has never been hospitalized. Never intubated. , patient reports typically 300s (ideal 550 for this patient). Noted to be using accessory muscles on exam though able to speak in full sentences on admission. Now not in respiratory distress. Received 3x duonebs and 40mg PO prednisone w/ additional 40mg IV solumedrol given upon admission.    - C/w prednisone 40mg PO qd    - C/w duonebs q4    - Will likely need to be started on low dose ICS prior to DC    - Patient also reports gerd-like symptoms. Will start on protonix 40 PO qd to prevent further asthma exacerbations in the setting of untreated GERD. 29M, prior smoker (quit 2 months ago), w/ asthma since childhood (frequent exacerbations but never hospitalized/intubated) presenting to ED w/ complaints of SOB and chest tightness admitted for acute asthma exacerbation.        Main diagnoses:    #asthma exacerbation: This is a pt with a history of asthma exacerbations typically in springtime, usu 3-4 yearly. Only w/ albuterol rescue inhaler, dose not take maintenance medication. Has never been hospitalized. Never intubated. , patient reports typically 300s (ideal 550 for this patient). Noted to be using accessory muscles on exam though able to speak in full sentences on admission. At time of d/c not in respiratory distress w/ . Received 3x duonebs and 40mg PO prednisone w/ additional 40mg IV solumedrol given upon admission. Then started on prednisone 40mg PO qd and pulmicort.         New medications - prednisone 40 mg qd, pulmicort    Labs to be followed outpatient     Exam to be followed outpatient 29M, prior smoker (quit 2 months ago), w/ asthma since childhood (frequent exacerbations but never hospitalized/intubated) presenting to ED w/ complaints of SOB and chest tightness admitted for acute asthma exacerbation.        Main diagnoses:    #asthma exacerbation: This is a pt with a history of asthma exacerbations typically in springtime, usu 3-4 yearly. Only w/ albuterol rescue inhaler, dose not take maintenance medication. Has never been hospitalized. Never intubated. , patient reports typically 300s (ideal 550 for this patient). Noted to be using accessory muscles on exam though able to speak in full sentences on admission. At time of d/c not in respiratory distress w/ . Received 3x duonebs and 40mg PO prednisone w/ additional 40mg IV solumedrol given upon admission. Then started on prednisone 40mg PO qd and pulmicort.         New medications - prednisone 40 mg qd, pulmicort    Labs to be followed outpatient    Exam to be followed outpatient

## 2020-06-04 NOTE — H&P ADULT - NSHPPHYSICALEXAM_GEN_ALL_CORE
.  VITAL SIGNS:  T(C): 36.8 (06-04-20 @ 01:09), Max: 36.8 (06-04-20 @ 01:09)  T(F): 98.3 (06-04-20 @ 01:09), Max: 98.3 (06-04-20 @ 01:09)  HR: 104 (06-04-20 @ 04:51) (101 - 114)  BP: 128/86 (06-04-20 @ 04:51) (107/62 - 133/78)  BP(mean): --  RR: 20 (06-04-20 @ 04:51) (20 - 23)  SpO2: 96% (06-04-20 @ 04:51) (95% - 99%)  Wt(kg): --    PHYSICAL EXAM:    Constitutional: AA male sitting in bed using accessory muscles of respiration  Head: NC/AT  Eyes: PERRL, EOMI, anicteric sclera  ENT: no nasal discharge; uvula midline, no oropharyngeal erythema or exudates; MMM  Neck: supple; no JVD or thyromegaly  Respiratory: Able to speak in full sentences, noted to be using accessory muscles of respiration, B/L end expiratory wheezing  Cardiac: Tachycardic, +S1/S2; no M/R/G; PMI non-displaced  Gastrointestinal: soft, NT/ND; no rebound or guarding; +BSx4  Genitourinary: normal external genitalia  Back: spine midline, no bony tenderness or step-offs; no CVAT B/L  Extremities: WWP, no clubbing or cyanosis; no peripheral edema. Capillary refill <2 sec  Musculoskeletal: NROM x4; no joint swelling, tenderness or erythema  Vascular: 2+ radial, femoral, DP/PT pulses B/L  Dermatologic: skin warm, dry and intact; no rashes, wounds, or scars  Lymphatic: no submandibular or cervical LAD  Neurologic: AAOx3; CNII-XII grossly intact; no focal deficits  Psychiatric: affect and characteristics of appearance, verbalizations, behaviors are appropriate

## 2020-06-04 NOTE — ED ADULT NURSE REASSESSMENT NOTE - NS ED NURSE REASSESS COMMENT FT1
Patient noted to have increased WOB, satin at 95% on RA. MD Adkins aware, will continuous to assess. Patient noted to have increased WOB, sating at 95% on RA. MD Adkins aware, will continuous to assess.

## 2020-06-04 NOTE — H&P ADULT - NSHPLABSRESULTS_GEN_ALL_CORE
.  LABS:                         15.1   10.90 )-----------( 282      ( 04 Jun 2020 02:38 )             45.6     06-04    140  |  100  |  14  ----------------------------<  92  3.8   |  24  |  1.26    Ca    9.9      04 Jun 2020 02:38    TPro  8.3  /  Alb  4.6  /  TBili  0.2  /  DBili  x   /  AST  26  /  ALT  30  /  AlkPhos  69  06-04        CARDIAC MARKERS ( 04 Jun 2020 02:38 )  x     / <0.01 ng/mL / x     / x     / x          Serum Pro-Brain Natriuretic Peptide: <5 pg/mL (06-04 @ 02:38)        RADIOLOGY, EKG & ADDITIONAL TESTS: Reviewed.

## 2020-06-04 NOTE — ED ADULT NURSE REASSESSMENT NOTE - NS ED NURSE REASSESS COMMENT FT1
Patient RR 22, no audible wheezing noted at this point. Patient appears more comfortable in stretcher, no tripoding positioning noted, remains on continuous pulse ox monitoring. Patient RR 22, wheezing auscultated bilaterally. Patient appears more comfortable in stretcher, no tripoding positioning noted, remains on continuous pulse ox monitoring. Patient RR 22, wheezing auscultated bilaterally. Patient appears more comfortable in stretcher, however is not able to tolerate HOB <60 degrees. no tripoding positioning noted, remains on continuous pulse ox monitoring.

## 2020-06-04 NOTE — ED PROVIDER NOTE - OBJECTIVE STATEMENT
29M hx asthma, c/o asthma exacerbation. pt states has not been feeling well for past 2 weeks. +wheezing, dry cough. states was seen in ED recently and given course of prednisone, states felt better while on prednisone.  tonight wheezing and tightness worsened.  no fevers. no sick contacts. no n/v/d. no hx of intubations or hospitalizations. pt states he thinks the exacerbation is related to weather as happens yearly around this time.  pt states his PMD prescribed him a daily medication today, however he is unsure of its name.

## 2020-06-04 NOTE — PROGRESS NOTE ADULT - PROBLEM SELECTOR PLAN 1
History of asthma exacerbations typically in springtime, usu 3-4 yearly. Only w/ albuterol rescue inhaler, dose not take maintenance medication. Has never been hospitalized. Never intubated. , patient reports typically 300s (ideal 550 for this patient). Noted to be using accessory muscles on exam though able to speak in full sentences on admission. Now not in respiratory distress. Received 3x duonebs and 40mg PO prednisone w/ additional 40mg IV solumedrol given upon admission.  - C/w prednisone 40mg PO qd  - C/w duonebs q4  - Will likely need to be started on low dose ICS prior to DC  - Patient also reports gerd-like symptoms. Will start on protonix 40 PO qd to prevent further asthma exacerbations in the setting of untreated GERD

## 2020-06-04 NOTE — PROGRESS NOTE ADULT - SUBJECTIVE AND OBJECTIVE BOX
OVERNIGHT EVENTS: No acute events    SUBJECTIVE / INTERVAL HPI: Patient seen and examined at bedside. Reports dyspnea which improves after nebulizer and chest tightness. Has a HA as well. Denied n/v, abd pain, urinary symptoms.     VITAL SIGNS:  Vital Signs Last 24 Hrs  T(C): 36.6 (04 Jun 2020 10:40), Max: 36.8 (04 Jun 2020 01:09)  T(F): 97.8 (04 Jun 2020 10:40), Max: 98.3 (04 Jun 2020 01:09)  HR: 98 (04 Jun 2020 10:40) (98 - 114)  BP: 123/70 (04 Jun 2020 10:40) (107/62 - 144/73)  BP(mean): --  RR: 16 (04 Jun 2020 10:40) (16 - 23)  SpO2: 96% (04 Jun 2020 10:40) (95% - 99%)    PHYSICAL EXAM:  General: WDWN; Patient is in NAD  HEENT: NC/AT; MMM  Neck: supple  Cardiovascular: +S1/S2, RRR  Respiratory: inspiratory & exp wheezing B/L; no W/R/R  Gastrointestinal: soft, NT/ND; +BS  Extremities: WWP; no edema present  Vascular: 2+ radial pulses B/L  Neurological: AAOx3; no focal deficits     MEDICATIONS:  MEDICATIONS  (STANDING):  albuterol/ipratropium for Nebulization 3 milliLiter(s) Nebulizer every 4 hours  pantoprazole    Tablet 40 milliGRAM(s) Oral before breakfast    MEDICATIONS  (PRN):  acetaminophen   Tablet .. 650 milliGRAM(s) Oral every 6 hours PRN Mild Pain (1 - 3), Moderate Pain (4 - 6)      ALLERGIES:  Allergies    No Known Allergies    Intolerances        LABS:                        15.1   10.90 )-----------( 282      ( 04 Jun 2020 02:38 )             45.6     06-04    140  |  100  |  14  ----------------------------<  92  3.8   |  24  |  1.26    Ca    9.9      04 Jun 2020 02:38    TPro  8.3  /  Alb  4.6  /  TBili  0.2  /  DBili  x   /  AST  26  /  ALT  30  /  AlkPhos  69  06-04        CAPILLARY BLOOD GLUCOSE          RADIOLOGY & ADDITIONAL TESTS: Reviewed. OVERNIGHT EVENTS: No acute events    SUBJECTIVE / INTERVAL HPI: Patient seen and examined at bedside. Reports dyspnea which improves after nebulizer and chest tightness. Has a HA as well. Denied n/v, abd pain, urinary symptoms.     VITAL SIGNS:  Vital Signs Last 24 Hrs  T(C): 36.6 (04 Jun 2020 10:40), Max: 36.8 (04 Jun 2020 01:09)  T(F): 97.8 (04 Jun 2020 10:40), Max: 98.3 (04 Jun 2020 01:09)  HR: 98 (04 Jun 2020 10:40) (98 - 114)  BP: 123/70 (04 Jun 2020 10:40) (107/62 - 144/73)  BP(mean): --  RR: 16 (04 Jun 2020 10:40) (16 - 23)  SpO2: 96% (04 Jun 2020 10:40) (95% - 99%)    PHYSICAL EXAM:  General: WDWN; Patient is in NAD  HEENT: NC/AT; MMM  Neck: supple  Cardiovascular: +S1/S2, RRR  Respiratory: inspiratory & exp wheezing B/L; no W/R/R  Gastrointestinal: soft, NT/ND; +BS  Extremities: WWP; no edema present  Vascular: 2+ radial pulses B/L  Neurological: AAOx3; no focal deficits   psych: normal affect  MSK: no joint swelling    MEDICATIONS:  MEDICATIONS  (STANDING):  albuterol/ipratropium for Nebulization 3 milliLiter(s) Nebulizer every 4 hours  pantoprazole    Tablet 40 milliGRAM(s) Oral before breakfast    MEDICATIONS  (PRN):  acetaminophen   Tablet .. 650 milliGRAM(s) Oral every 6 hours PRN Mild Pain (1 - 3), Moderate Pain (4 - 6)      ALLERGIES:  Allergies    No Known Allergies    Intolerances        LABS:                        15.1   10.90 )-----------( 282      ( 04 Jun 2020 02:38 )             45.6     06-04    140  |  100  |  14  ----------------------------<  92  3.8   |  24  |  1.26    Ca    9.9      04 Jun 2020 02:38    TPro  8.3  /  Alb  4.6  /  TBili  0.2  /  DBili  x   /  AST  26  /  ALT  30  /  AlkPhos  69  06-04        CAPILLARY BLOOD GLUCOSE          RADIOLOGY & ADDITIONAL TESTS: Reviewed.

## 2020-06-04 NOTE — H&P ADULT - PROBLEM SELECTOR PLAN 2
F: tolerating PO, no IVF  E: replete K<4, Mg<2  N: Regular    VTE Prophylaxis: IMPROVE 0  C: Full Code  D: RMF

## 2020-06-04 NOTE — ED PROVIDER NOTE - PROGRESS NOTE DETAILS
persistent wheezing, no infiltrates on CXR.  pt unable to lay flat without worsening SOB. will admit for continued duonebs prn and steroid administration persistent wheezing, no infiltrates on CXR.  pt unable to lay flat without worsening SOB. will admit for continued duonebs prn and steroid administration.

## 2020-06-04 NOTE — H&P ADULT - ASSESSMENT
28 y/o M, prior smoker (quit 2 months ago) w/ hx of asthma since childhood (frequent exacerbations but never hospitalized and never intubated) presenting to ED w/ complaints of SOB and chest tightness admitted for acute asthma exacerbation

## 2020-06-04 NOTE — H&P ADULT - HISTORY OF PRESENT ILLNESS
30 y/o M, prior smoker (quit 2 months ago) w/ hx of asthma since childhood (frequent exacerbations but never hospitalized and never intubated) presenting to ED w/ complaints of SOB and chest tightness. He states that for the past few weeks he has had worsening wheezing and SOB. He was recently in the ED 1 week ago where he was prescribed PO prednisone which improved his symptoms and he was discharged. According to the patient, he was initially feeling better on prednisone but then began to have increasing SOB and chest tightness when it completed. He reports using his rescue inhaler several times per day for the past month and has been unable to sleep for the past week d/t nightly exacerbations. He called his PCP today who prescribed him "another inhaler" which he did not yet . He denies fevers, HA, vision changes, CP, abdominal pain, N/V/D/C. He has no pets. PEF at time of exam 150 (patient reports best >300).    ED Course:  Vitals: T-98.3, HR-101, BP-133/78, RR-23 SpO2-97% on RA  Labs: WBC 10.9, no eosinophilia, Cr 1.26, trop negative, BNP negative, COVID negative  Imaging: No acute infiltrates  Intervention: Received duonebs x3 and 40mg PO prednisone.

## 2020-06-04 NOTE — DISCHARGE NOTE PROVIDER - NSDCCPCAREPLAN_GEN_ALL_CORE_FT
PRINCIPAL DISCHARGE DIAGNOSIS  Diagnosis: Exacerbation of intermittent asthma, unspecified asthma severity  Assessment and Plan of Treatment: You had a flair up of your asthma. Please take prednisone 40 mg daily for 3 more days. Because of your frequent flair ups and because you are requiring a lot of albuterol at home, we are going to step up your asthma treatment. This means that you should take a daily inhaler in addition to your rescue inhaler to control you asthma. Please also follow up with Dr. Lisa or Dr. Mcbride who are lung specialists who will continue to manage your asthma. PRINCIPAL DISCHARGE DIAGNOSIS  Diagnosis: Exacerbation of intermittent asthma, unspecified asthma severity  Assessment and Plan of Treatment: You had a flair up of your asthma. Please take prednisone 40 mg daily for 3 more days (starting tomorrow, because you got it today in the hospital). Because of your frequent flair ups and because you are requiring a lot of albuterol at home, we are going to step up your asthma treatment. This means that you should take a daily inhaler in addition to your rescue inhaler to control you asthma. Please also follow up with Dr. Bernal or Dr. Mcbride who are lung specialists who will continue to manage your asthma.

## 2020-06-04 NOTE — H&P ADULT - NSHPSOCIALHISTORY_GEN_ALL_CORE
Lives at home with his parents and his wife. 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 Previous smoker quit 2 months ago (smoked 4 cigs/day for 9 years). Denies alcohol and recreational drug use. No pets at home

## 2020-06-04 NOTE — ED ADULT NURSE NOTE - OBJECTIVE STATEMENT
28 yo M pmhx asthma, no hx of intubation, presents to ED co asthma exacerbation. PT ambulates from front triage with steady gait, speaking clearly and coherently. Pt reports using inhaler more frequently over the past week, last used at around 8pm and 10pm without relief. Audible wheezing and wheezing upon auscultation patient immediately receives duoneb in private room. Patient placed on continuos pulse ox monitoring. 30 yo M pmhx asthma, no hx of intubation, presents to ED co asthma exacerbation. PT ambulates from front triage with steady gait, speaking clearly and coherently. Pt reports using inhaler more frequently over the past week, last used at around 8pm and 10pm without relief. Audible wheezing and wheezing upon auscultation patient immediately receives duoneb in private room. No Tripod positioning noted, chest rise equal and symmetrical. Patient placed on continuos pulse ox monitoring.

## 2020-06-04 NOTE — DISCHARGE NOTE PROVIDER - NSDCMRMEDTOKEN_GEN_ALL_CORE_FT
Ventolin HFA 90 mcg/inh inhalation aerosol: 2 puff(s) inhaled every 6 hours predniSONE 20 mg oral tablet: 2 tab(s) orally once a day  Pulmicort Flexhaler 180 mcg/inh inhalation powder: 1 puff(s) inhaled 2 times a day   Ventolin HFA 90 mcg/inh inhalation aerosol: 2 puff(s) inhaled every 6 hours

## 2020-06-04 NOTE — DISCHARGE NOTE PROVIDER - NSDCFUADDAPPT_GEN_ALL_CORE_FT
Please follow up with either Dr. Bernal or Dr. Mcbride who are both lung specialists who will continue to manage your asthma.

## 2020-06-05 ENCOUNTER — TRANSCRIPTION ENCOUNTER (OUTPATIENT)
Age: 30
End: 2020-06-05

## 2020-06-05 VITALS
SYSTOLIC BLOOD PRESSURE: 123 MMHG | HEART RATE: 84 BPM | DIASTOLIC BLOOD PRESSURE: 70 MMHG | OXYGEN SATURATION: 96 % | RESPIRATION RATE: 16 BRPM | TEMPERATURE: 98 F

## 2020-06-05 PROCEDURE — 84484 ASSAY OF TROPONIN QUANT: CPT

## 2020-06-05 PROCEDURE — 36415 COLL VENOUS BLD VENIPUNCTURE: CPT

## 2020-06-05 PROCEDURE — 96365 THER/PROPH/DIAG IV INF INIT: CPT

## 2020-06-05 PROCEDURE — 99239 HOSP IP/OBS DSCHRG MGMT >30: CPT | Mod: GC

## 2020-06-05 PROCEDURE — 83880 ASSAY OF NATRIURETIC PEPTIDE: CPT

## 2020-06-05 PROCEDURE — 71045 X-RAY EXAM CHEST 1 VIEW: CPT

## 2020-06-05 PROCEDURE — 99285 EMERGENCY DEPT VISIT HI MDM: CPT | Mod: 25

## 2020-06-05 PROCEDURE — 80053 COMPREHEN METABOLIC PANEL: CPT

## 2020-06-05 PROCEDURE — 87635 SARS-COV-2 COVID-19 AMP PRB: CPT

## 2020-06-05 PROCEDURE — 94640 AIRWAY INHALATION TREATMENT: CPT

## 2020-06-05 PROCEDURE — 93005 ELECTROCARDIOGRAM TRACING: CPT

## 2020-06-05 PROCEDURE — 85025 COMPLETE CBC W/AUTO DIFF WBC: CPT

## 2020-06-05 RX ORDER — BUDESONIDE, MICRONIZED 100 %
1 POWDER (GRAM) MISCELLANEOUS
Qty: 1 | Refills: 3
Start: 2020-06-05 | End: 2020-10-02

## 2020-06-05 RX ORDER — ALBUTEROL 90 UG/1
2 AEROSOL, METERED ORAL
Qty: 1 | Refills: 3
Start: 2020-06-05 | End: 2020-10-02

## 2020-06-05 RX ORDER — ALBUTEROL 90 UG/1
2 AEROSOL, METERED ORAL
Qty: 1 | Refills: 3
Start: 2020-06-05 | End: 2022-09-13

## 2020-06-05 RX ADMIN — PANTOPRAZOLE SODIUM 40 MILLIGRAM(S): 20 TABLET, DELAYED RELEASE ORAL at 06:12

## 2020-06-05 RX ADMIN — Medication 40 MILLIGRAM(S): at 06:12

## 2020-06-05 RX ADMIN — Medication 3 MILLILITER(S): at 02:10

## 2020-06-05 RX ADMIN — Medication 0.25 MILLIGRAM(S): at 06:13

## 2020-06-05 RX ADMIN — Medication 3 MILLILITER(S): at 06:12

## 2020-06-05 RX ADMIN — Medication 3 MILLILITER(S): at 09:45

## 2020-06-05 NOTE — DISCHARGE NOTE NURSING/CASE MANAGEMENT/SOCIAL WORK - PATIENT PORTAL LINK FT
You can access the FollowMyHealth Patient Portal offered by [facility] by registering at the following website: [email or web address]. By joining [employer]’s FollowMyHealth portal, you will also be able to view your health information using other applications (apps) compatible with our system.

## 2020-06-08 DIAGNOSIS — J45.21 MILD INTERMITTENT ASTHMA WITH (ACUTE) EXACERBATION: ICD-10-CM

## 2020-06-08 DIAGNOSIS — F17.211 NICOTINE DEPENDENCE, CIGARETTES, IN REMISSION: ICD-10-CM

## 2020-06-30 ENCOUNTER — APPOINTMENT (OUTPATIENT)
Dept: PULMONOLOGY | Facility: CLINIC | Age: 30
End: 2020-06-30
Payer: COMMERCIAL

## 2020-06-30 VITALS
BODY MASS INDEX: 30.66 KG/M2 | WEIGHT: 219 LBS | SYSTOLIC BLOOD PRESSURE: 117 MMHG | HEIGHT: 71 IN | TEMPERATURE: 98.1 F | DIASTOLIC BLOOD PRESSURE: 81 MMHG | HEART RATE: 78 BPM | OXYGEN SATURATION: 96 %

## 2020-06-30 DIAGNOSIS — Z78.9 OTHER SPECIFIED HEALTH STATUS: ICD-10-CM

## 2020-06-30 DIAGNOSIS — Z87.891 PERSONAL HISTORY OF NICOTINE DEPENDENCE: ICD-10-CM

## 2020-06-30 DIAGNOSIS — Z82.5 FAMILY HISTORY OF ASTHMA AND OTHER CHRONIC LOWER RESPIRATORY DISEASES: ICD-10-CM

## 2020-06-30 DIAGNOSIS — J45.909 UNSPECIFIED ASTHMA, UNCOMPLICATED: ICD-10-CM

## 2020-06-30 DIAGNOSIS — Z83.3 FAMILY HISTORY OF DIABETES MELLITUS: ICD-10-CM

## 2020-06-30 PROCEDURE — 99204 OFFICE O/P NEW MOD 45 MIN: CPT

## 2020-06-30 RX ORDER — ALBUTEROL SULFATE 90 UG/1
108 (90 BASE) AEROSOL, METERED RESPIRATORY (INHALATION) EVERY 6 HOURS
Qty: 1 | Refills: 0 | Status: ACTIVE | COMMUNITY
Start: 2020-06-30 | End: 1900-01-01

## 2020-06-30 NOTE — PHYSICAL EXAM
[No Acute Distress] : no acute distress [Normal Oropharynx] : normal oropharynx [Normal Appearance] : normal appearance [No Neck Mass] : no neck mass [Normal Rate/Rhythm] : normal rate/rhythm [Normal S1, S2] : normal s1, s2 [No Murmurs] : no murmurs [No Resp Distress] : no resp distress [Clear to Auscultation Bilaterally] : clear to auscultation bilaterally [No Abnormalities] : no abnormalities [Normal Gait] : normal gait [Benign] : benign [No Clubbing] : no clubbing [No Cyanosis] : no cyanosis [No Edema] : no edema [Normal Color/ Pigmentation] : normal color/ pigmentation [FROM] : FROM [No Focal Deficits] : no focal deficits [Oriented x3] : oriented x3 [Normal Affect] : normal affect

## 2020-06-30 NOTE — HISTORY OF PRESENT ILLNESS
[Former] : former [< 30 pack-years] : < 30 pack-years [TextBox_4] : 30 yo with childhood asthma referred from the ED for asthma management. Had an acute asthma exacerbation about 1 month ago. He had severe cough, wheezing and SOB. Had to use his VIVIANA about 100 times as per his history. He was treated with nebulizers and steroids. He was prescribed Pulmicort 1 puff BID and has been doing well. Last night had increased use of VIVIANA. Thinks trigger is rain but he is not sure. Has been on maintenance inhalers when younger, not recently. Never intubated. Many people in his family have asthma. Former smoker of about 4 pack years; quit 3 months ago. No occupational exposures. Never had formal PFTs as an adult.  [TextBox_11] : 0.25 [TextBox_13] : 9 [YearQuit] : 2020

## 2020-06-30 NOTE — ASSESSMENT
[FreeTextEntry1] : 30yo with history of childhood asthma referred from the ED for asthma management. Trigger 3 weeks ago is unknown. He has had some rebound in symptoms recently. Will increase ICS to 2 puffs BID for the next 3 months. Will need full PFTs and 6MWT at 4 weeks when he is improved. Continue with VIVIANA as needed with ICS for breakthrough symptoms. Will do CBC with diff and IgE levels at follow up. Follow up in 4 weeks.

## 2020-07-27 NOTE — ED ADULT NURSE NOTE - EXTENSIONS OF SELF_ADULT
Patients mother calling states patient has missed her period and would like to bring her in for a blood test.    None

## 2020-07-28 ENCOUNTER — APPOINTMENT (OUTPATIENT)
Dept: PULMONOLOGY | Facility: CLINIC | Age: 30
End: 2020-07-28
Payer: COMMERCIAL

## 2020-07-28 PROCEDURE — 94726 PLETHYSMOGRAPHY LUNG VOLUMES: CPT

## 2020-07-28 PROCEDURE — 94618 PULMONARY STRESS TESTING: CPT

## 2020-07-28 PROCEDURE — 94010 BREATHING CAPACITY TEST: CPT

## 2020-07-28 PROCEDURE — 94729 DIFFUSING CAPACITY: CPT

## 2020-08-18 ENCOUNTER — APPOINTMENT (OUTPATIENT)
Dept: PULMONOLOGY | Facility: CLINIC | Age: 30
End: 2020-08-18
Payer: COMMERCIAL

## 2020-08-18 PROCEDURE — 99443: CPT

## 2020-08-18 RX ORDER — BUDESONIDE 180 UG/1
180 AEROSOL, POWDER RESPIRATORY (INHALATION)
Qty: 1 | Refills: 5 | Status: ACTIVE | COMMUNITY
Start: 1900-01-01 | End: 1900-01-01

## 2020-08-18 NOTE — PROCEDURE
[FreeTextEntry1] : \par PFTs from 7/28/2020\par FEV1 77\par FEV/FVC 77\par TLC 87\par ERV 19\par \par DLCO 73\par *obstructive defect suggested by RV; mild reduction in DLCO\par **later determined to be a tech issue leading to erroneous lung volumes\par \par 6MWT from 7/28/2020\par pre/post HR 90/108\par pre/post saturation 98/97%\par pre/post Clem Dyspnea 6/8\par pre/post Clem Fatigue 5/7\par meters walked 640.5\par *acceptable walk distance, no desaturation

## 2020-08-18 NOTE — ASSESSMENT
[FreeTextEntry1] : 30yo with mild persistent asthma. Doing well on ICS. No exacerbations. PFTs reviewed and are normal (see note above). Six minute walk test is also with no abnormalities. Will continue ICS therapy for 3 months and will consider step down at that time. Mr. Holt will follow up at the end of Sept/beg of October.

## 2020-08-18 NOTE — HISTORY OF PRESENT ILLNESS
[Home] : at home, [unfilled] , at the time of the visit. [Medical Office: ([facility])___] : at the medical office located in  [Verbal consent obtained from patient] : the patient, [unfilled] [< 30 pack-years] : < 30 pack-years [Former] : former [TextBox_11] : 0.25 [TextBox_4] : 30 yo with childhood asthma referred from the ED for asthma management. Had an acute asthma exacerbation about 1 month ago. He had severe cough, wheezing and SOB. Had to use his VIVIANA about 100 times as per his history. He was treated with nebulizers and steroids. He was prescribed Pulmicort 1 puff BID and has been doing well. Last night had increased use of VIVIANA. Thinks trigger is rain but he is not sure. Has been on maintenance inhalers when younger, not recently. Never intubated. Many people in his family have asthma. Former smoker of about 4 pack years; quit 3 months ago. No occupational exposures. Never had formal PFTs as an adult. \par \par 8/18/2020\par Doing well on ICS; no use of VIVIANA or ICS PRN. No exacerbations. [TextBox_13] : 9 [YearQuit] : 2020

## 2021-05-14 NOTE — ED ADULT TRIAGE NOTE - NS ED NURSE DIRECT TO ROOM YN
Jesus Moran)  Neurosurgery  [phone] [place] [address]  Phone: [phone]  Fax: [phone]  Follow Up Time: 1 week  
No

## 2021-05-18 NOTE — ED PROVIDER NOTE - CHIEF COMPLAINT
The patient is a 27y Male complaining of asthma exacerbation. Stage 2: Additional Anesthesia Type: 2% lidocaine with epinephrine

## 2021-05-28 ENCOUNTER — EMERGENCY (EMERGENCY)
Facility: HOSPITAL | Age: 31
LOS: 1 days | Discharge: HOME | End: 2021-05-28
Attending: EMERGENCY MEDICINE | Admitting: EMERGENCY MEDICINE
Payer: COMMERCIAL

## 2021-05-28 VITALS
TEMPERATURE: 98 F | RESPIRATION RATE: 16 BRPM | OXYGEN SATURATION: 97 % | HEIGHT: 68 IN | DIASTOLIC BLOOD PRESSURE: 50 MMHG | SYSTOLIC BLOOD PRESSURE: 141 MMHG | HEART RATE: 90 BPM | WEIGHT: 225.09 LBS

## 2021-05-28 VITALS
OXYGEN SATURATION: 98 % | RESPIRATION RATE: 16 BRPM | TEMPERATURE: 98 F | DIASTOLIC BLOOD PRESSURE: 55 MMHG | HEART RATE: 87 BPM | SYSTOLIC BLOOD PRESSURE: 138 MMHG

## 2021-05-28 DIAGNOSIS — Z79.51 LONG TERM (CURRENT) USE OF INHALED STEROIDS: ICD-10-CM

## 2021-05-28 DIAGNOSIS — J45.41 MODERATE PERSISTENT ASTHMA WITH (ACUTE) EXACERBATION: ICD-10-CM

## 2021-05-28 DIAGNOSIS — Z20.822 CONTACT WITH AND (SUSPECTED) EXPOSURE TO COVID-19: ICD-10-CM

## 2021-05-28 DIAGNOSIS — R06.02 SHORTNESS OF BREATH: ICD-10-CM

## 2021-05-28 LAB
ANION GAP SERPL CALC-SCNC: 13 MMOL/L — SIGNIFICANT CHANGE UP (ref 5–17)
BASOPHILS # BLD AUTO: 0.05 K/UL — SIGNIFICANT CHANGE UP (ref 0–0.2)
BASOPHILS NFR BLD AUTO: 0.6 % — SIGNIFICANT CHANGE UP (ref 0–2)
BUN SERPL-MCNC: 10 MG/DL — SIGNIFICANT CHANGE UP (ref 7–23)
CALCIUM SERPL-MCNC: 10.2 MG/DL — SIGNIFICANT CHANGE UP (ref 8.4–10.5)
CHLORIDE SERPL-SCNC: 104 MMOL/L — SIGNIFICANT CHANGE UP (ref 96–108)
CO2 SERPL-SCNC: 25 MMOL/L — SIGNIFICANT CHANGE UP (ref 22–31)
CREAT SERPL-MCNC: 1.09 MG/DL — SIGNIFICANT CHANGE UP (ref 0.5–1.3)
EOSINOPHIL # BLD AUTO: 0.24 K/UL — SIGNIFICANT CHANGE UP (ref 0–0.5)
EOSINOPHIL NFR BLD AUTO: 2.7 % — SIGNIFICANT CHANGE UP (ref 0–6)
GLUCOSE SERPL-MCNC: 91 MG/DL — SIGNIFICANT CHANGE UP (ref 70–99)
HCT VFR BLD CALC: 41.8 % — SIGNIFICANT CHANGE UP (ref 39–50)
HGB BLD-MCNC: 14.2 G/DL — SIGNIFICANT CHANGE UP (ref 13–17)
IMM GRANULOCYTES NFR BLD AUTO: 0.8 % — SIGNIFICANT CHANGE UP (ref 0–1.5)
LYMPHOCYTES # BLD AUTO: 4.03 K/UL — HIGH (ref 1–3.3)
LYMPHOCYTES # BLD AUTO: 45.9 % — HIGH (ref 13–44)
MCHC RBC-ENTMCNC: 30.3 PG — SIGNIFICANT CHANGE UP (ref 27–34)
MCHC RBC-ENTMCNC: 34 GM/DL — SIGNIFICANT CHANGE UP (ref 32–36)
MCV RBC AUTO: 89.1 FL — SIGNIFICANT CHANGE UP (ref 80–100)
MONOCYTES # BLD AUTO: 0.67 K/UL — SIGNIFICANT CHANGE UP (ref 0–0.9)
MONOCYTES NFR BLD AUTO: 7.6 % — SIGNIFICANT CHANGE UP (ref 2–14)
NEUTROPHILS # BLD AUTO: 3.72 K/UL — SIGNIFICANT CHANGE UP (ref 1.8–7.4)
NEUTROPHILS NFR BLD AUTO: 42.4 % — LOW (ref 43–77)
NRBC # BLD: 0 /100 WBCS — SIGNIFICANT CHANGE UP (ref 0–0)
PLATELET # BLD AUTO: 296 K/UL — SIGNIFICANT CHANGE UP (ref 150–400)
POTASSIUM SERPL-MCNC: 4.1 MMOL/L — SIGNIFICANT CHANGE UP (ref 3.5–5.3)
POTASSIUM SERPL-SCNC: 4.1 MMOL/L — SIGNIFICANT CHANGE UP (ref 3.5–5.3)
RBC # BLD: 4.69 M/UL — SIGNIFICANT CHANGE UP (ref 4.2–5.8)
RBC # FLD: 11.8 % — SIGNIFICANT CHANGE UP (ref 10.3–14.5)
SARS-COV-2 RNA SPEC QL NAA+PROBE: SIGNIFICANT CHANGE UP
SODIUM SERPL-SCNC: 142 MMOL/L — SIGNIFICANT CHANGE UP (ref 135–145)
WBC # BLD: 8.78 K/UL — SIGNIFICANT CHANGE UP (ref 3.8–10.5)
WBC # FLD AUTO: 8.78 K/UL — SIGNIFICANT CHANGE UP (ref 3.8–10.5)

## 2021-05-28 PROCEDURE — 99285 EMERGENCY DEPT VISIT HI MDM: CPT

## 2021-05-28 PROCEDURE — 96374 THER/PROPH/DIAG INJ IV PUSH: CPT

## 2021-05-28 PROCEDURE — 94640 AIRWAY INHALATION TREATMENT: CPT

## 2021-05-28 PROCEDURE — U0005: CPT

## 2021-05-28 PROCEDURE — 71046 X-RAY EXAM CHEST 2 VIEWS: CPT | Mod: 26

## 2021-05-28 PROCEDURE — 71046 X-RAY EXAM CHEST 2 VIEWS: CPT

## 2021-05-28 PROCEDURE — 80048 BASIC METABOLIC PNL TOTAL CA: CPT

## 2021-05-28 PROCEDURE — 36415 COLL VENOUS BLD VENIPUNCTURE: CPT

## 2021-05-28 PROCEDURE — 99285 EMERGENCY DEPT VISIT HI MDM: CPT | Mod: 25

## 2021-05-28 PROCEDURE — U0003: CPT

## 2021-05-28 PROCEDURE — 85025 COMPLETE CBC W/AUTO DIFF WBC: CPT

## 2021-05-28 RX ORDER — IPRATROPIUM/ALBUTEROL SULFATE 18-103MCG
3 AEROSOL WITH ADAPTER (GRAM) INHALATION ONCE
Refills: 0 | Status: COMPLETED | OUTPATIENT
Start: 2021-05-28 | End: 2021-05-28

## 2021-05-28 RX ADMIN — Medication 3 MILLILITER(S): at 02:00

## 2021-05-28 RX ADMIN — Medication 3 MILLILITER(S): at 02:28

## 2021-05-28 RX ADMIN — Medication 125 MILLIGRAM(S): at 02:00

## 2021-05-28 RX ADMIN — Medication 3 MILLILITER(S): at 04:00

## 2021-05-28 NOTE — ED ADULT TRIAGE NOTE - CHIEF COMPLAINT QUOTE
"I have asthma". Pt presents with c/o "difficulty taking in air and tightness in lungs" x 3 days. No relief with Rx inhalers.

## 2021-05-28 NOTE — ED PROVIDER NOTE - OBJECTIVE STATEMENT
31 y/o M pt with PMHx of asthma (hospitalized once 1yr ago, never intubated) presents to ED c/o wheezing and chest tightness nearly every day for the past 1month, requiring him to use his albuterol inhaler multiple times daily. He had leftover Prednisone for which he took a 4day course and completed 1.5weeks ago, however over the past 4 days, his chest tightness and wheezing has recurred. He has been using his Albuterol almost hourly for the past day. He was on an inhaled steroid in the past, but hasn't been on one in about a year. He just started having non-productive cough tonight. He believes that weather changes trigger his asthma.     Had his first Pfizer covid vaccine on 5/18  Only has other PMHx of H. pylori for which he currently being treated  No hx of DVT/PE, heart disease, HTN, HLD, or DM

## 2021-05-28 NOTE — ED PROVIDER NOTE - CLINICAL SUMMARY MEDICAL DECISION MAKING FREE TEXT BOX
Asthma exacerbation. No signs of resp distress. Normal O2 sats.  Mild-moderate wheezing on initial exam.  Plan: nebs, steroids.  CXR and labs in case of failure to improve.

## 2021-05-28 NOTE — ED PROVIDER NOTE - ATTENDING CONTRIBUTION TO CARE
29 yo m w hx of asthma, requiring hospitalization in the past 1 night (1 year ago) presents to ED with concern for chest tightness, wheezing all month.  He is using albuterol on a daily basis, notes he is using inhalers nearly every hour recently.  Took several day course of steroids last week with some improvement, though now notes relapse.  + Dry cough tonight., no fever, chills or chest pain.  On exam, patient is non toxic in appearance.  HRRR, lungs with mild diffuse bilateral exp wheezes.  Speaking comfortably in full sentences.  No retractions.  Abd soft, NT/ND.  Will check CXR due to refractory nature of asthma, give nebs, steroids, reassess and dispo accordingly.

## 2021-05-28 NOTE — ED PROVIDER NOTE - PHYSICAL EXAMINATION
CONSTITUTIONAL: WD,WN. NAD.    SKIN: Normal color and turgor. No rash.    HEAD: NC/AT.  EYES: Conjunctiva clear. EOMI. PERRL.    ENT: Airway patent, OP without erythema, tonsillar swelling or exudate; uvula midline without swelling. Nasal mucosa clear, no rhinorrhea.   RESPIRATORY:  Breathing non-labored. No retractions or accessory muscle use.  Lungs with bilateral faint expiratory wheezing.   CARDIOVASCULAR:  RRR, S1S2. No M/R/G.      GI:  Abdomen soft, nontender.    MSK: Neck supple with painless ROM.  No lower extremity edema or calf tenderness.  No joint swelling or ROM limitation.  NEURO: Alert and oriented; CN II-XII grossly intact. Speech clear. 5/5 strength in all extremities.  Good balance. Steady gait.

## 2021-05-28 NOTE — ED ADULT NURSE NOTE - OBJECTIVE STATEMENT
pt to ED for "asthma" pt presents with difficulty breathing, chest tightness for 3days, no relief with MDI. no cp, dizziness, numbness, n/v/d, fever/ chills. currently smoker

## 2021-05-28 NOTE — ED PROVIDER NOTE - PROGRESS NOTE DETAILS
CXR: no pneumothorax, no pneumomediastinum, no infiltrates, no pleural effusion Patient states he feels much better, chest tightness almost completely resolved.  Moving good air, lungs with minimal exp wheeze.  Will give another treatment and likely DC home. Patient states he is ready to go home.  Feels well, no longer has any chest tightness.  Lungs CTA.  No signs of resp distress.  Stable for DC; will take prednisone for next 4 days; pt states he has Pulmicort inhalers at home that he was saving until his insurance became active again; he also has albuterol pumps.  Has a primary care physician for follow up. Klepfish: received s/o pending reassessment. feeling much better. Clinically no indication for further emergent ED workup or hospitalization at this time. Comfortable for dc, outpt f/u.

## 2021-05-28 NOTE — ED PROVIDER NOTE - NSFOLLOWUPINSTRUCTIONS_ED_ALL_ED_FT
Take prednisone 40 mg daily for next 4 days.  Restart Pulmicort when you finish taking prednisone.   Keep albuterol pump with you in case of emergency.    Follow up with your primary care physician tomorrow or after the weekend.    Return to the Emergency Department if you have any new or worsening symptoms, or if you have any concerns.  ================================    Asthma    WHAT YOU NEED TO KNOW:    Asthma is a lung disease that makes breathing difficult. Chronic inflammation and reactions to triggers narrow the airways in the lungs. Asthma can become life-threatening if it is not managed.    Normal vs Asthmatic Bronchioles Adult         DISCHARGE INSTRUCTIONS:    Call your local emergency number (911 in the [place]) if:   •You have severe shortness of breath.      •The skin around your neck and ribs pulls in with each breath.      •Your peak flow numbers are in the red zone of your AAP.      Return to the emergency department if:   •You have shortness of breath, even after you take your short-term medicine as directed.      •Your lips or nails turn blue or gray.      Call your doctor or asthma specialist if:   •You run out of medicine before your next refill is due.      •Your symptoms get worse.      •You need to take more medicine than usual to control your symptoms.      •You have questions or concerns about your condition or care.      Medicines:   •Medicines may be used to decrease inflammation, open airways, and make it easier to breathe. Medicines may be inhaled, taken as a pill, or injected. Short-term medicines relieve your symptoms quickly. Long-term medicines are used to prevent future asthma attacks. Other medicines may be needed if your regular medicines are not able to prevent attacks. You may also need medicine to help control your allergies. Ask your healthcare provider for more information about any medicine you are given and how to take it safely.      •Take your medicine as directed. Contact your healthcare provider if you think your medicine is not helping or if you have side effects. Tell him of her if you are allergic to any medicine. Keep a list of the medicines, vitamins, and herbs you take. Include the amounts, and when and why you take them. Bring the list or the pill bottles to follow-up visits. Carry your medicine list with you in case of an emergency.      Manage your symptoms and prevent future attacks:   •Follow your Asthma Action Plan (AAP). This is a written plan that you and your healthcare provider create. It explains which medicine you need and when to change doses if necessary. It also explains how you can monitor symptoms and use a peak flow meter. The meter measures how well your lungs are working.      •Manage other health conditions, such as allergies, acid reflux, and sleep apnea.      •Identify and avoid triggers. These may include pets, dust mites, mold, and cockroaches.      •Do not smoke or be around others who smoke. Nicotine and other chemicals in cigarettes and cigars can cause lung damage. Ask your healthcare provider for information if you currently smoke and need help to quit. E-cigarettes or smokeless tobacco still contain nicotine. Talk to your healthcare provider before you use these products.      •Ask about the flu vaccine. The flu can make your asthma worse. You may need a yearly flu shot.      Follow up with your healthcare provider as directed: You will need to return to make sure your medicine is working and your symptoms are controlled. You may be referred to an asthma or allergy specialist. You may be asked to keep a record of your peak flow values and bring it with you to your appointments. Write down your questions so you remember to ask them during your visits.

## 2021-05-28 NOTE — ED PROVIDER NOTE - PATIENT PORTAL LINK FT
You can access the FollowMyHealth Patient Portal offered by [facility] by registering at the following website: [email or web address]. By joining [employer]’s FollowMyHealth portal, you will also be able to view your health information using other applications (apps) compatible with our system.

## 2021-06-05 ENCOUNTER — EMERGENCY (EMERGENCY)
Facility: HOSPITAL | Age: 31
LOS: 1 days | Discharge: HOME | End: 2021-06-05
Admitting: EMERGENCY MEDICINE
Payer: COMMERCIAL

## 2021-06-05 VITALS
DIASTOLIC BLOOD PRESSURE: 66 MMHG | HEART RATE: 75 BPM | HEIGHT: 68 IN | RESPIRATION RATE: 19 BRPM | SYSTOLIC BLOOD PRESSURE: 105 MMHG | WEIGHT: 104.94 LBS | TEMPERATURE: 98 F

## 2021-06-05 VITALS
DIASTOLIC BLOOD PRESSURE: 82 MMHG | TEMPERATURE: 98 F | SYSTOLIC BLOOD PRESSURE: 111 MMHG | HEART RATE: 79 BPM | RESPIRATION RATE: 17 BRPM | OXYGEN SATURATION: 98 %

## 2021-06-05 DIAGNOSIS — R07.89 OTHER CHEST PAIN: ICD-10-CM

## 2021-06-05 DIAGNOSIS — J45.901 UNSPECIFIED ASTHMA WITH (ACUTE) EXACERBATION: ICD-10-CM

## 2021-06-05 DIAGNOSIS — Z79.51 LONG TERM (CURRENT) USE OF INHALED STEROIDS: ICD-10-CM

## 2021-06-05 PROCEDURE — 94640 AIRWAY INHALATION TREATMENT: CPT

## 2021-06-05 PROCEDURE — 99284 EMERGENCY DEPT VISIT MOD MDM: CPT | Mod: 25

## 2021-06-05 PROCEDURE — 96374 THER/PROPH/DIAG INJ IV PUSH: CPT

## 2021-06-05 PROCEDURE — 99284 EMERGENCY DEPT VISIT MOD MDM: CPT

## 2021-06-05 RX ORDER — IPRATROPIUM/ALBUTEROL SULFATE 18-103MCG
3 AEROSOL WITH ADAPTER (GRAM) INHALATION ONCE
Refills: 0 | Status: COMPLETED | OUTPATIENT
Start: 2021-06-05 | End: 2021-06-05

## 2021-06-05 RX ORDER — SODIUM CHLORIDE 9 MG/ML
500 INJECTION INTRAMUSCULAR; INTRAVENOUS; SUBCUTANEOUS ONCE
Refills: 0 | Status: COMPLETED | OUTPATIENT
Start: 2021-06-05 | End: 2021-06-05

## 2021-06-05 RX ORDER — BROMPHENIRAMINE MALEATE, PSEUDOEPHEDRINE HYDROCHLORIDE, AND DEXTROMETHORPHAN HYDROBROMIDE 2; 10; 30 MG/5ML; MG/5ML; MG/5ML
10 SOLUTION ORAL
Qty: 210 | Refills: 0
Start: 2021-06-05 | End: 2021-06-11

## 2021-06-05 RX ADMIN — Medication 125 MILLIGRAM(S): at 01:38

## 2021-06-05 RX ADMIN — Medication 3 MILLILITER(S): at 01:05

## 2021-06-05 RX ADMIN — SODIUM CHLORIDE 1000 MILLILITER(S): 9 INJECTION INTRAMUSCULAR; INTRAVENOUS; SUBCUTANEOUS at 01:38

## 2021-06-05 RX ADMIN — Medication 3 MILLILITER(S): at 03:00

## 2021-06-05 NOTE — ED PROVIDER NOTE - PATIENT PORTAL LINK FT
You can access the FollowMyHealth Patient Portal offered by [facility] by registering at the following website: [email or web address]. By joining [employer]’s FollowMyHealth portal, you will also be able to view your health information using other applications (apps) compatible with our system.

## 2021-06-05 NOTE — ED PROVIDER NOTE - CLINICAL SUMMARY MEDICAL DECISION MAKING FREE TEXT BOX
31 yo male with h/o asthma in the ER with wheezing and chest tightness x 2 days. No fever or chills, mild dry cough and no cold or flu-like symptoms. Pt has appointment scheduled with his new pulmonologist next week. plan: symptomatic care, anticipate d/c home when improve.

## 2021-06-05 NOTE — ED PROVIDER NOTE - NSFOLLOWUPINSTRUCTIONS_ED_ALL_ED_FT
Asthma Attack Prevention, Adult      Although you may not be able to control the fact that you have asthma, you can take actions to prevent episodes of asthma (asthma attacks).      How can this condition affect me?    Asthma attacks (flare ups) can cause trouble breathing, wheezing, and coughing. They may keep you from doing activities you like to do.      What can increase my risk?  Coming into contact with things that cause asthma symptoms (asthma triggers) can put you at risk for an asthma attack. Common asthma triggers include:•Things you are allergic to (allergens), such as:  •Dust mite and cockroach droppings.      •Pet dander.      •Mold.      •Pollen from trees and grasses.      •Food allergies. This might be a specific food or added chemicals called sulfites.      •Irritants, such as:  •Weather changes including very cold, dry, or humid air.      •Smoke. This includes campfire smoke, air pollution, and tobacco smoke.      •Strong odors from aerosol sprays and fumes from perfume, candles, and household .      •Other triggers, such as:  •Certain medicines. This includes NSAIDs, such as ibuprofen and aspirin.      •Viral respiratory infections (colds), including runny nose (rhinitis) or infection in the sinuses (sinusitis).      •Activity including exercise, laughing, or crying.      •Not using inhaled medicines (corticosteroids) as told.          What actions can I take to prevent an asthma attack?    •Stay healthy. Stay up to date on all immunizations as told by your health care provider, including the yearly flu (influenza) vaccine and pneumonia vaccine.      •Many asthma attacks can be prevented by carefully following your written asthma action plan.        Follow your asthma action plan   Work with your health care provider to create a written asthma action plan. This plan should include:  •A list of your asthma triggers and how to avoid or reduce them.      •A list of symptoms that you may have during an asthma attack.      •Information about which medicine to take, when to take the medicine, and how much of the medicine to take.      •Information to help you understand your peak flow measurements.      •Daily actions that you can take to prevent (control) your asthma symptoms.      •Contact information for your health care providers.      •If you have an asthma attack, act quickly. Follow the emergency steps on your written asthma action plan. This may prevent you from needing to go to the hospital.    Monitor your asthma. To do this:•Use your peak flow meter every morning and every evening for 2–3 weeks or as told by your health care provider.  •Record the results in a journal.      •A drop in your peak flow numbers on one or more days may mean that you are starting to have an asthma attack, even if you are not having symptoms.        •When you have asthma symptoms, write them down in a journal.      •Write down in your journal how often you need to use your fast-acting rescue inhaler. If you are using your rescue inhaler more often, it may mean that your asthma is not under control. Talk with your health care provider about adjusting your asthma treatment plan to help you prevent future asthma attacks and gain better control of your condition.      Lifestyle     •Avoid or reduce contact with known outdoor allergens by staying indoors, keeping windows closed, and using air conditioning when pollen and mold counts are high.      • Do not use any products that contain nicotine or tobacco, such as cigarettes, e-cigarettes, and chewing tobacco. If you need help quitting, ask your health care provider.      •If you are overweight, consider a weight-loss plan.      •Find ways to cope with stress and your feelings, such as mindfulness, relaxation, or breathing exercises.      •Ask your health care provider if a breathing exercise program (pulmonary rehabilitation) may be helpful to control symptoms and improve your quality of life.        Medicines      •Take over-the-counter and prescription medicines only as told by your health care provider.      • Do not stop taking your medicine and do not take less medicine even if you are doing well.    •Let your health care provider know:  •How often you use your rescue inhaler.      •How often you have symptoms when you are taking your regular medicines.      •If you wake up at night because of asthma symptoms.      •If you have more trouble with your breathing when you exercise.        Activity     •Do your normal activities as told by your health care provider. Ask your health care provider what activities are safe for you.    •Some people have asthma symptoms or more asthma symptoms when they exercise. This is called exercise-induced bronchoconstriction (EIB). If you have this problem, talk with your health care provider about how to manage EIB. Some tips to follow include:  •Use your fast-acting inhaler before exercise.      •Exercise indoors if it is very cold, humid, or the pollen and mold counts are high.      •Warm up and cool down before and after exercise.      •Stop exercising right away if your asthma symptoms start or get worse.          Where to find more information    •Asthma and Allergy Foundation of [place]: www.aafa.org      •Centers for Disease Control and Prevention: www.cdc.gov      •American Lung Association: www.lung.org      •National Heart, Lung, and Blood [place]: www.nhlbi.nih.gov      •World Health Organization: www.who.int        Get help right away if:    •You have followed your written asthma action plan and your symptoms are not improving.        Summary    •Asthma attacks (flare ups) can cause trouble breathing, wheezing, and coughing. They may keep you from doing activities you normally like to do.      •Work with your health care provider to create a written asthma action plan.      • Do not stop taking your medicine and do not take less medicine even if you are doing well.      • Do not use any products that contain nicotine or tobacco, such as cigarettes, e-cigarettes, and chewing tobacco. If you need help quitting, ask your health care provider.      This information is not intended to replace advice given to you by your health care provider. Make sure you discuss any questions you have with your health care provider.

## 2021-06-05 NOTE — ED ADULT NURSE NOTE - OBJECTIVE STATEMENT
Pt presents to ED C/O SOB, wheezing, asthma exacerbation starting last night at about 2 or 3AM. Pt states " I was using my inhaler pumps at home and they were working but then when the rain started today my breathing became worse" Pt has hx of asthma, denies other PMH. Able to speak full sentences, placed in ISO room for nebulizer treatment as ordered. RN continuing to monitor.

## 2021-06-05 NOTE — ED ADULT NURSE NOTE - NSIMPLEMENTINTERV_GEN_ALL_ED
Implemented All Universal Safety Interventions:  [place] to call system. Call bell, personal items and telephone within reach. Instruct patient to call for assistance. Room bathroom lighting operational. Non-slip footwear when patient is off stretcher. Physically safe environment: no spills, clutter or unnecessary equipment. Stretcher in lowest position, wheels locked, appropriate side rails in place.

## 2021-06-05 NOTE — ED PROVIDER NOTE - OBJECTIVE STATEMENT
29 yo male with h/o asthma, in the ER c/o chest tightness and wheezing. Pt states he was seen for same symptoms last week and improved after steroids were given for him and then he was taking Prednisone for 4 days at home. Pt reports for the past 2 days his chest is tight and tonight he was wheezing and had difficulty breathing. Pt denies fever or chills, denies productive cough, denies any sore throat or nasal congestion. Pt received his 1st dose of covid-19 vaccination. Denies any sick contact.

## 2021-06-05 NOTE — ED ADULT TRIAGE NOTE - OTHER COMPLAINTS
CC of generalized abd pain +NV, HIV + on meds
CC of sob x yesterday but worst today, took inhaler but to no relief

## 2022-05-17 ENCOUNTER — EMERGENCY (EMERGENCY)
Facility: HOSPITAL | Age: 32
LOS: 1 days | Discharge: HOME | End: 2022-05-17
Attending: EMERGENCY MEDICINE | Admitting: EMERGENCY MEDICINE
Payer: COMMERCIAL

## 2022-05-17 VITALS
OXYGEN SATURATION: 94 % | DIASTOLIC BLOOD PRESSURE: 79 MMHG | HEIGHT: 68 IN | TEMPERATURE: 98 F | RESPIRATION RATE: 18 BRPM | HEART RATE: 92 BPM | WEIGHT: 238.1 LBS | SYSTOLIC BLOOD PRESSURE: 127 MMHG

## 2022-05-17 VITALS
HEART RATE: 93 BPM | RESPIRATION RATE: 18 BRPM | DIASTOLIC BLOOD PRESSURE: 80 MMHG | OXYGEN SATURATION: 97 % | SYSTOLIC BLOOD PRESSURE: 126 MMHG

## 2022-05-17 LAB
RAPID RVP RESULT: SIGNIFICANT CHANGE UP
SARS-COV-2 RNA SPEC QL NAA+PROBE: SIGNIFICANT CHANGE UP

## 2022-05-17 PROCEDURE — 71046 X-RAY EXAM CHEST 2 VIEWS: CPT

## 2022-05-17 PROCEDURE — 0225U NFCT DS DNA&RNA 21 SARSCOV2: CPT

## 2022-05-17 PROCEDURE — 99284 EMERGENCY DEPT VISIT MOD MDM: CPT | Mod: 25

## 2022-05-17 PROCEDURE — 71046 X-RAY EXAM CHEST 2 VIEWS: CPT | Mod: 26

## 2022-05-17 PROCEDURE — 99285 EMERGENCY DEPT VISIT HI MDM: CPT | Mod: 25

## 2022-05-17 PROCEDURE — 94640 AIRWAY INHALATION TREATMENT: CPT

## 2022-05-17 RX ORDER — IPRATROPIUM/ALBUTEROL SULFATE 18-103MCG
3 AEROSOL WITH ADAPTER (GRAM) INHALATION ONCE
Refills: 0 | Status: COMPLETED | OUTPATIENT
Start: 2022-05-17 | End: 2022-05-17

## 2022-05-17 RX ORDER — FAMOTIDINE 10 MG/ML
20 INJECTION INTRAVENOUS ONCE
Refills: 0 | Status: COMPLETED | OUTPATIENT
Start: 2022-05-17 | End: 2022-05-17

## 2022-05-17 RX ADMIN — Medication 50 MILLIGRAM(S): at 04:41

## 2022-05-17 RX ADMIN — Medication 3 MILLILITER(S): at 04:43

## 2022-05-17 RX ADMIN — FAMOTIDINE 20 MILLIGRAM(S): 10 INJECTION INTRAVENOUS at 05:01

## 2022-05-17 RX ADMIN — Medication 3 MILLILITER(S): at 05:34

## 2022-05-17 NOTE — ED PROVIDER NOTE - OBJECTIVE STATEMENT
31 yr old male, history of asthma (hospi  quit smoking 2-3 months ago.   follows w pulmonologist but s had difficulty w insurance for 31 yr old male, history of asthma since childhood (frequent exacerbations, hospitalized once last year, never intubated), former smoker (quit 2 months ago), presents to the Emergency Department with asthma exacerbation. Pt reports asthma / wheezing started bothering him 1.5 days ago, was taking home albuterol inhaler, initially w some improvement. Now not getting improvement w inhaler at home, feels as though he needs steroids. Dry cough tonight that he attributes to the asthma.   No fever, sore throat, congestion, rhinorrhea, chest pain, abd pain, n/v/d, leg swelling, headache. No sick contacts.   Has pulmonologist, has not seen lately. Has not been on steroids since hospital visit last year.

## 2022-05-17 NOTE — ED PROVIDER NOTE - CLINICAL SUMMARY MEDICAL DECISION MAKING FREE TEXT BOX
pt with asthma exacerbation x 1.5 days. no longer improving w home inhaler. no recent exacerbations. no illness / URI recently.   no respiratory distress on arrival. wheezing throughout but moving air well.   will get cxr to ensure no underlying pneumonia.   otherwise trial duo-nebs. give oral steroids  rpt lung exams.   if improvement plan for dc on steroids and follow up w established pulmonologist.

## 2022-05-17 NOTE — ED PROVIDER NOTE - ATTENDING APP SHARED VISIT CONTRIBUTION OF CARE
Vitals wnl, exam as above.  CXR wnl. Given nebs/steroids, feeling much better.   Discussed importance of outpt follow up and return precautions. Clinically no indication for further emergent ED workup or hospitalization at this time. Comfortable for dc, outpt f/u.

## 2022-05-17 NOTE — ED PROVIDER NOTE - NS ED ATTENDING STATEMENT MOD
This was a shared visit with the KATHE. I reviewed and verified the documentation and independently performed the documented:

## 2022-05-17 NOTE — ED PROVIDER NOTE - PATIENT PORTAL LINK FT
You can access the FollowMyHealth Patient Portal offered by [facility] by registering at the following website: [email or web address]. By joining [employer]’s FollowMyHealth portal, you will also be able to view your health information using other applications (apps) compatible with our system.

## 2022-05-17 NOTE — ED ADULT NURSE NOTE - OBJECTIVE STATEMENT
c/o asthma attack x today woke up from his sleep. took his albuterol inhalers but to no avail. Denies any chest pain. bilateral air entry.

## 2022-05-17 NOTE — ED ADULT NURSE NOTE - CHIEF COMPLAINT QUOTE
asthma attack that started tonight; had been using Albuterol inhaler with no relief; denies fever, chills or cough

## 2022-05-17 NOTE — ED PROVIDER NOTE - PROGRESS NOTE DETAILS
CXR clear.  after one neb pt feeling much better. got first dose oral steroids.   lungs clear.   pt requesting one more nebulzier treatment, feels as though he will be able to go home after second nebulizer treatment pt feeling significantly better  requesting discharge. almost out of albuterol inhaler - will send refil. dc on steroids x5d.   return precautions discussed at length . CXR clear.  after one neb pt feeling much better. got first dose oral steroids. lungs w faint end expiratory wheezing, improved compared to arrival.   pt requesting one more nebulzier treatment, feels as though he will be able to go home after second nebulizer treatment pt feeling significantly better  lungs clear. breathing comfortably.   requesting discharge. almost out of albuterol inhaler - will send refil. dc on steroids x5d.   return precautions discussed at length .

## 2022-05-18 DIAGNOSIS — Z87.891 PERSONAL HISTORY OF NICOTINE DEPENDENCE: ICD-10-CM

## 2022-05-18 DIAGNOSIS — R06.2 WHEEZING: ICD-10-CM

## 2022-05-18 DIAGNOSIS — J45.901 UNSPECIFIED ASTHMA WITH (ACUTE) EXACERBATION: ICD-10-CM

## 2022-05-18 DIAGNOSIS — Z20.822 CONTACT WITH AND (SUSPECTED) EXPOSURE TO COVID-19: ICD-10-CM

## 2022-06-08 ENCOUNTER — EMERGENCY (EMERGENCY)
Facility: HOSPITAL | Age: 32
LOS: 1 days | Discharge: HOME | End: 2022-06-08
Attending: EMERGENCY MEDICINE | Admitting: EMERGENCY MEDICINE
Payer: COMMERCIAL

## 2022-06-08 VITALS
SYSTOLIC BLOOD PRESSURE: 128 MMHG | RESPIRATION RATE: 18 BRPM | DIASTOLIC BLOOD PRESSURE: 63 MMHG | TEMPERATURE: 98 F | HEART RATE: 94 BPM | WEIGHT: 227.96 LBS | HEIGHT: 68 IN | OXYGEN SATURATION: 97 %

## 2022-06-08 PROCEDURE — 99284 EMERGENCY DEPT VISIT MOD MDM: CPT

## 2022-06-08 PROCEDURE — 94640 AIRWAY INHALATION TREATMENT: CPT

## 2022-06-08 PROCEDURE — 99284 EMERGENCY DEPT VISIT MOD MDM: CPT | Mod: 25

## 2022-06-08 RX ORDER — ALBUTEROL 90 UG/1
2 AEROSOL, METERED ORAL EVERY 6 HOURS
Refills: 0 | Status: DISCONTINUED | OUTPATIENT
Start: 2022-06-08 | End: 2022-06-12

## 2022-06-08 RX ORDER — IPRATROPIUM/ALBUTEROL SULFATE 18-103MCG
3 AEROSOL WITH ADAPTER (GRAM) INHALATION
Qty: 30 | Refills: 0
Start: 2022-06-08 | End: 2022-07-07

## 2022-06-08 RX ORDER — IPRATROPIUM/ALBUTEROL SULFATE 18-103MCG
3 AEROSOL WITH ADAPTER (GRAM) INHALATION
Refills: 0 | Status: COMPLETED | OUTPATIENT
Start: 2022-06-08 | End: 2022-06-08

## 2022-06-08 RX ORDER — IPRATROPIUM/ALBUTEROL SULFATE 18-103MCG
3 AEROSOL WITH ADAPTER (GRAM) INHALATION
Qty: 1 | Refills: 0
Start: 2022-06-08 | End: 2022-07-07

## 2022-06-08 RX ADMIN — Medication 50 MILLIGRAM(S): at 22:49

## 2022-06-08 RX ADMIN — Medication 3 MILLILITER(S): at 22:30

## 2022-06-08 RX ADMIN — Medication 3 MILLILITER(S): at 22:48

## 2022-06-08 RX ADMIN — ALBUTEROL 2 PUFF(S): 90 AEROSOL, METERED ORAL at 23:05

## 2022-06-08 RX ADMIN — Medication 3 MILLILITER(S): at 22:49

## 2022-06-08 NOTE — ED PROVIDER NOTE - CPE EDP PSYCH NORM
Validate Lesion Size: No Post-Care Instructions: I reviewed with the patient in detail post-care instructions. Patient is to keep the biopsy site dry overnight, and then apply bacitracin twice daily until healed. Patient may apply hydrogen peroxide soaks to remove any crusting. Information: Selecting Yes will display possible errors in your note based on the variables you have selected. This validation is only offered as a suggestion for you. PLEASE NOTE THAT THE VALIDATION TEXT WILL BE REMOVED WHEN YOU FINALIZE YOUR NOTE. IF YOU WANT TO FAX A PRELIMINARY NOTE YOU WILL NEED TO TOGGLE THIS TO 'NO' IF YOU DO NOT WANT IT IN YOUR FAXED NOTE. Validate Note Data (See Information Below): Yes Dressing: bandage Electrodesiccation And Curettage Text: The wound bed was treated with electrodesiccation and curettage after the biopsy was performed. Anesthesia Volume In Cc: 0.5 Curettage Text: The wound bed was treated with curettage after the biopsy was performed. Type Of Destruction Used: Curettage Anesthesia Type: 1% lidocaine without epinephrine Electrodesiccation Text: The wound bed was treated with electrodesiccation after the biopsy was performed. Wound Care: Vaseline Billing Type: Third-Party Bill Silver Nitrate Text: The wound bed was treated with silver nitrate after the biopsy was performed. Size Of Lesion In Cm: 0 Detail Level: Detailed Consent: Written consent was obtained and risks were reviewed including but not limited to scarring, infection, bleeding, scabbing, incomplete removal, nerve damage and allergy to anesthesia. Cryotherapy Text: The wound bed was treated with cryotherapy after the biopsy was performed. Depth Of Biopsy: dermis Biopsy Type: DIF Hemostasis: Electrocautery Biopsy Type: H and E Biopsy Method: Dermablade Notification Instructions: Patient will be notified of biopsy results. However, patient instructed to call the office if not contacted within 2 weeks. normal...

## 2022-06-08 NOTE — ED PROVIDER NOTE - PATIENT PORTAL LINK FT
You can access the FollowMyHealth Patient Portal offered by [facility] by registering at the following website: [email or web address]. By joining [employer]’s FollowMyHealth portal, you will also be able to view your health information using other applications (apps) compatible with our system.

## 2022-06-08 NOTE — ED ADULT NURSE NOTE - OBJECTIVE STATEMENT
pt received into spot C A&Ox3 ambulatory appears comfortable arrives via walk In triage for eval asthma exacerbation respirations appear even and unlabored speaks clear full sentences though noted wheezing over lung fields pt medicated per orders

## 2022-06-08 NOTE — ED PROVIDER NOTE - OBJECTIVE STATEMENT
31 M , history of asthma since childhood (frequent exacerbations, hospitalized once last year, never intubated), former smoker (quit 2 months ago- now vapes - presents to the Emergency Department with asthma exacerbation. Pt reports asthma / wheezing started bothering him 1.5 days ago, was taking home albuterol inhaler, initially w some improvement  inhaler ran out + dry cough  no f/c no loss of appetite

## 2022-06-08 NOTE — ED ADULT NURSE NOTE - NSSUHOSCREENINGYN_ED_ALL_ED
Patient referred by Dr. Muñoz. Attempted to reach patient 3 times with no return call. Sent letter   
Yes - the patient is able to be screened

## 2022-06-11 DIAGNOSIS — J45.901 UNSPECIFIED ASTHMA WITH (ACUTE) EXACERBATION: ICD-10-CM

## 2022-06-11 DIAGNOSIS — Z87.891 PERSONAL HISTORY OF NICOTINE DEPENDENCE: ICD-10-CM

## 2022-06-11 DIAGNOSIS — R06.2 WHEEZING: ICD-10-CM

## 2022-07-13 NOTE — ED ADULT TRIAGE NOTE - BMI (KG/M2)
"Pt called requesting clarification on carvedilol orders:    3/17/22: Pt saw Dr. King. His carvedilol dose was 25 mg BID.    Patient went to the hospital where they changed the medication to 6.25 mg 4 tablets twice daily to equal 25 mg BID.    TCU note 5/11/22 states carvedilol 6.25 mg BID. No where in this note does it state it was changed, it only says \"continue\".    Would you like patient to be on carvedilol 25 mg BID or 6.25 mg BID. Please advice.    Bethany MICHAEL RN  07/12/22 at 1:00 PM     "
"Refill request received for lisinopril. Wife asking for lower dose due to 10 mg making him dizzy and his BP \"low\".     Dr. King ordered 2.5 mg on 3/17/22. Pt was admitted to [facility] in April where they increased medication to 10 mg. Documentation unclear as to reason why medication was increased.    Called patient to get more information on low readings but patient was unable to remember what his readings were.     Please advise if pt should be on 2.5 mg or 10 mg. Orders for follow up Sept. 2022.    Bethany MICHAEL RN  07/07/22 at 1:40 PM     "
Called patient regarding Dr. King recommendations to decrease lisinopril to 2.5 mg. New prescription sent.   Bethany MICHAEL RN  07/12/22 at 12:45 PM     
He can be on lisinopril 2.5 mg daily.  If home blood pressure is averaging over 130/80, he should call us and the dose can be increased.    Lorenzo  
Medication reconciliation completed. Carvedilol 6.25 mg BID discontinued and Carvedilol 25 mg BID ordered and sent to pharmacy.    Bethany MICHAEL RN  07/13/22 at 8:48 AM       
Please see previous notes. PT is requesting a decrease in medication due to feeling dizzy and having low Bps.      
[facility] Refill Guideline reviewed.  Medication meets criteria for refill.    
The patient's wife called and said that the patient has been out of this medication for 2 days now and the pharmacy already gave him an emergency refill    PT's wife wants to get a call when RX has been sent   Wife said Lisinopril 10 MG is to strong for the patient and wants a 2.5 MG        [id number] - [place], [place] - [address]    
When I saw him in clinic in March, he was on carvedilol 25 mg twice daily.  I am assuming this should be his dose.    Lorezno  
26.6

## 2023-02-09 NOTE — ED ADULT NURSE REASSESSMENT NOTE - RESPIRATORY RATE (BREATHS/MIN)
[facility] - [place]  Dermatology  [address], [address]  [place] [address]  Phone: [phone]  Fax: [phone]

## 2024-02-10 NOTE — ED ADULT NURSE NOTE - NS ED NURSE RECORD ANOTHER VITAL SIGN
FAMILY HISTORY:  Father  Still living? Unknown  FH: CAD (coronary artery disease), Age at diagnosis: Age Unknown    Mother  Still living? Unknown  FH: CAD (coronary artery disease), Age at diagnosis: Age Unknown    
Yes
